# Patient Record
Sex: MALE | Race: WHITE | NOT HISPANIC OR LATINO | Employment: FULL TIME | URBAN - NONMETROPOLITAN AREA
[De-identification: names, ages, dates, MRNs, and addresses within clinical notes are randomized per-mention and may not be internally consistent; named-entity substitution may affect disease eponyms.]

---

## 2017-01-05 RX ORDER — DICLOFENAC SODIUM 75 MG/1
TABLET, DELAYED RELEASE ORAL
Qty: 60 TABLET | Refills: 2 | Status: SHIPPED | OUTPATIENT
Start: 2017-01-05 | End: 2017-07-10 | Stop reason: DRUGHIGH

## 2017-02-21 ENCOUNTER — OFFICE VISIT (OUTPATIENT)
Dept: PAIN MEDICINE | Facility: CLINIC | Age: 47
End: 2017-02-21

## 2017-02-21 ENCOUNTER — APPOINTMENT (OUTPATIENT)
Dept: LAB | Facility: HOSPITAL | Age: 47
End: 2017-02-21

## 2017-02-21 VITALS
WEIGHT: 310.6 LBS | SYSTOLIC BLOOD PRESSURE: 130 MMHG | BODY MASS INDEX: 42.07 KG/M2 | HEIGHT: 72 IN | DIASTOLIC BLOOD PRESSURE: 80 MMHG

## 2017-02-21 DIAGNOSIS — Z79.899 HIGH RISK MEDICATIONS (NOT ANTICOAGULANTS) LONG-TERM USE: ICD-10-CM

## 2017-02-21 DIAGNOSIS — G57.12 MERALGIA PARESTHETICA, LEFT: ICD-10-CM

## 2017-02-21 DIAGNOSIS — G89.29 CHRONIC RIGHT SHOULDER PAIN: Primary | ICD-10-CM

## 2017-02-21 DIAGNOSIS — M25.511 CHRONIC RIGHT SHOULDER PAIN: Primary | ICD-10-CM

## 2017-02-21 PROCEDURE — 80307 DRUG TEST PRSMV CHEM ANLYZR: CPT | Performed by: PAIN MEDICINE

## 2017-02-21 PROCEDURE — G0481 DRUG TEST DEF 8-14 CLASSES: HCPCS | Performed by: PAIN MEDICINE

## 2017-02-21 PROCEDURE — 99214 OFFICE O/P EST MOD 30 MIN: CPT | Performed by: PAIN MEDICINE

## 2017-02-21 RX ORDER — OXYCODONE AND ACETAMINOPHEN 10; 325 MG/1; MG/1
TABLET ORAL
Refills: 0 | COMMUNITY
Start: 2017-01-26 | End: 2023-02-23 | Stop reason: ALTCHOICE

## 2017-02-21 RX ORDER — PNEUMOCOCCAL VACCINE POLYVALENT 25; 25; 25; 25; 25; 25; 25; 25; 25; 25; 25; 25; 25; 25; 25; 25; 25; 25; 25; 25; 25; 25; 25 UG/.5ML; UG/.5ML; UG/.5ML; UG/.5ML; UG/.5ML; UG/.5ML; UG/.5ML; UG/.5ML; UG/.5ML; UG/.5ML; UG/.5ML; UG/.5ML; UG/.5ML; UG/.5ML; UG/.5ML; UG/.5ML; UG/.5ML; UG/.5ML; UG/.5ML; UG/.5ML; UG/.5ML; UG/.5ML; UG/.5ML
INJECTION, SOLUTION INTRAMUSCULAR; SUBCUTANEOUS
Refills: 0 | COMMUNITY
Start: 2017-01-26 | End: 2023-02-23 | Stop reason: ALTCHOICE

## 2017-02-21 RX ORDER — OXYCODONE AND ACETAMINOPHEN 10; 325 MG/1; MG/1
1 TABLET ORAL
Qty: 150 TABLET | Refills: 0 | Status: SHIPPED | OUTPATIENT
Start: 2017-02-21 | End: 2017-03-23

## 2017-02-21 RX ORDER — LIDOCAINE 50 MG/G
OINTMENT TOPICAL
Qty: 1 TUBE | Refills: 11 | Status: SHIPPED | OUTPATIENT
Start: 2017-02-21 | End: 2023-02-23 | Stop reason: ALTCHOICE

## 2017-02-21 RX ORDER — GLIPIZIDE 10 MG/1
TABLET ORAL
Refills: 2 | COMMUNITY
Start: 2017-02-04 | End: 2017-07-10 | Stop reason: DRUGHIGH

## 2017-02-21 RX ORDER — DICLOFENAC SODIUM 75 MG/1
75 TABLET, DELAYED RELEASE ORAL 2 TIMES DAILY
Qty: 60 TABLET | Refills: 2 | Status: SHIPPED | OUTPATIENT
Start: 2017-02-21 | End: 2017-07-10 | Stop reason: SDUPTHER

## 2017-02-21 RX ORDER — DICLOFENAC SODIUM 75 MG/1
TABLET, DELAYED RELEASE ORAL
COMMUNITY
Start: 2012-05-15 | End: 2017-02-21 | Stop reason: SDUPTHER

## 2017-02-21 RX ORDER — LINAGLIPTIN 5 MG/1
TABLET, FILM COATED ORAL
Refills: 2 | COMMUNITY
Start: 2017-02-04

## 2017-02-21 NOTE — PROGRESS NOTES
Ghulam Wright Jr. is a 46 y.o. male.   1970    HPI:   Location: right shoulder  Quality: aching and dull  Severity: 7/10  Timing: constant  Alleviating: pain medication  Aggravating: increased activity     Patient reports that the opioid medication still provides him good relief and allows increased activity than he would have without the opioid medication. He denies side effects.  Has had some left leg pain laterally.  Described as burning.  Denies back pain.  Has had this before.  Shoulder is stable and well controlled.      The following portions of the patient's history were reviewed by me and updated as appropriate: allergies, current medications, past family history, past medical history, past social history, past surgical history and problem list.    Past Medical History   Diagnosis Date   • Drug therapy      Long-term   • Shoulder pain, right        Social History     Social History   • Marital status: Single     Spouse name: N/A   • Number of children: N/A   • Years of education: N/A     Occupational History   • Not on file.     Social History Main Topics   • Smoking status: Current Every Day Smoker     Types: Cigarettes   • Smokeless tobacco: Not on file   • Alcohol use No   • Drug use: No   • Sexual activity: Defer     Other Topics Concern   • Not on file     Social History Narrative       Family History   Problem Relation Age of Onset   • Diabetes Father    • Alcohol abuse Other    • Asthma Other    • ADD / ADHD Other    • Bipolar disorder Other    • Cancer Other      other   • Depression Other      Depressive Disorder   • Hypertension Other    • Lung cancer Other    • Mental illness Other      Mental Disorder   • Osteoarthritis Other    • Other Other      Respiratory Disorder   • Other Other      Smoking tobacco         Current Outpatient Prescriptions:   •  atorvastatin (LIPITOR) 40 MG tablet, Take 40 mg by mouth daily., Disp: , Rfl:   •  diclofenac (VOLTAREN) 75 MG EC tablet, TAKE 1 TABLET BY  MOUTH TWICE DAILY, Disp: 60 tablet, Rfl: 2  •  glipiZIDE (GLUCOTROL) 5 MG tablet, Take 10 mg by mouth 2 (two) times a day before meals., Disp: , Rfl:   •  lansoprazole (PREVACID) 30 MG capsule, Take 30 mg by mouth daily., Disp: , Rfl:   •  lisinopril (PRINIVIL,ZESTRIL) 20 MG tablet, Take 20 mg by mouth daily., Disp: , Rfl:   •  metFORMIN (GLUCOPHAGE) 1000 MG tablet, Take 1,000 mg by mouth 2 (two) times a day with meals., Disp: , Rfl:   •  oxyCODONE-acetaminophen (PERCOCET)  MG per tablet, TK 1 T PO FIVE TIMES DAILY, Disp: , Rfl: 0  •  ranitidine (ZANTAC) 300 MG tablet, Take 300 mg by mouth., Disp: , Rfl:   •  diclofenac (VOLTAREN) 75 MG EC tablet, Take 1 tablet by mouth 2 (Two) Times a Day., Disp: 60 tablet, Rfl: 2  •  FLUVIRIN suspension injection, ADM 0.5ML IM UTD, Disp: , Rfl: 0  •  glipiZIDE (GLUCOTROL) 10 MG tablet, TK 1 T PO BID, Disp: , Rfl: 2  •  lidocaine (PREMIUM LIDOCAINE) 5 % ointment, Apply  topically Every 2 (Two) Hours As Needed for mild pain (1-3)., Disp: 1 tube, Rfl: 11  •  metFORMIN (GLUCOPHAGE) 500 MG tablet, TK 2 TS PO BID FOR DIABETES, Disp: , Rfl: 2  •  NICOTINE STEP 1 21 MG/24HR patch, MINNIE 1 PATCH TRANSDERMALLY ONCE D, Disp: , Rfl: 0  •  oxyCODONE-acetaminophen (PERCOCET)  MG per tablet, Take 1 tablet by mouth 5 (Five) Times a Day for 30 days., Disp: 150 tablet, Rfl: 0  •  oxyCODONE-acetaminophen (PERCOCET)  MG per tablet, Take 1 tablet by mouth 5 (Five) Times a Day for 30 days., Disp: 150 tablet, Rfl: 0  •  oxyCODONE-acetaminophen (PERCOCET) 7.5-325 MG per tablet, Take 1 tablet by mouth 5 times daily as needed., Disp: , Rfl:   •  PNEUMOVAX 23 25 MCG/0.5ML vaccine, ADM 0.5ML IM UTD, Disp: , Rfl: 0  •  TRADJENTA 5 MG tablet tablet, TK 1 T PO QD FOR DIABETES, Disp: , Rfl: 2    No Known Allergies      Review of Systems   Musculoskeletal:        R.shoulder       10 system review of systems was reviewed and negative except for above.    Physical Exam   Constitutional: He appears  well-developed and well-nourished. No distress.   Musculoskeletal:        Right shoulder: He exhibits decreased range of motion (decr rom right shoulder with pain) and tenderness.   Neurological: He is alert. He has normal strength.   decr lt sensation left lat thigh.   Psychiatric: He has a normal mood and affect. His behavior is normal. Judgment normal.       Ghulam was seen today for shoulder pain.    Diagnoses and all orders for this visit:    Chronic right shoulder pain  -     ToxASSURE Select 13 (MW)    High risk medications (not anticoagulants) long-term use  -     ToxASSURE Select 13 (MW)    Meralgia paresthetica, left    Other orders  -     diclofenac (VOLTAREN) 75 MG EC tablet; Take 1 tablet by mouth 2 (Two) Times a Day.  -     oxyCODONE-acetaminophen (PERCOCET)  MG per tablet; Take 1 tablet by mouth 5 (Five) Times a Day for 30 days.  -     oxyCODONE-acetaminophen (PERCOCET)  MG per tablet; Take 1 tablet by mouth 5 (Five) Times a Day for 30 days.  -     lidocaine (PREMIUM LIDOCAINE) 5 % ointment; Apply  topically Every 2 (Two) Hours As Needed for mild pain (1-3).        Medication: Patient reports no negative side effects, Patient reports appropriate usage and storage habits, Patient's opioid provides enough reflief to be more active and perform activities of daily living with less discomfort. and Refill opioid medication as above.  Diclofenac refill.  Lidocaine ointment to lateral left leg.      Interventional: none at this time    Rehab: none at this time.  Suggested wearing loose fitting jeans and belts.  Suggested wt loss.  May consider lfc nerve block.    Behavioral: No aberrant behavior noted. [facility] Report #[id number]  was reviewed and is consistent with stated history    Urine drug screen Ordered today to test for drugs of abuse and prescribed medications.  States he is considering starting diet pills.          This document has been electronically signed by Jere Rasmussen MD on  February 21, 2017 12:20 PM

## 2017-02-25 LAB
CONV REPORT SUMMARY: NORMAL
Lab: NORMAL

## 2017-04-19 ENCOUNTER — APPOINTMENT (OUTPATIENT)
Dept: LAB | Facility: HOSPITAL | Age: 47
End: 2017-04-19

## 2017-04-19 ENCOUNTER — OFFICE VISIT (OUTPATIENT)
Dept: PAIN MEDICINE | Facility: CLINIC | Age: 47
End: 2017-04-19

## 2017-04-19 VITALS
SYSTOLIC BLOOD PRESSURE: 122 MMHG | WEIGHT: 312.9 LBS | DIASTOLIC BLOOD PRESSURE: 72 MMHG | HEIGHT: 72 IN | BODY MASS INDEX: 42.38 KG/M2

## 2017-04-19 DIAGNOSIS — G57.12 MERALGIA PARESTHETICA, LEFT: ICD-10-CM

## 2017-04-19 DIAGNOSIS — G89.29 CHRONIC RIGHT SHOULDER PAIN: Primary | ICD-10-CM

## 2017-04-19 DIAGNOSIS — G57.12 MERALGIA PARESTHETICA, LEFT: Primary | ICD-10-CM

## 2017-04-19 DIAGNOSIS — Z79.899 HIGH RISK MEDICATIONS (NOT ANTICOAGULANTS) LONG-TERM USE: ICD-10-CM

## 2017-04-19 DIAGNOSIS — M25.511 CHRONIC RIGHT SHOULDER PAIN: Primary | ICD-10-CM

## 2017-04-19 PROBLEM — G57.10 MERALGIA PARESTHETICA: Status: ACTIVE | Noted: 2017-04-19

## 2017-04-19 PROCEDURE — G0481 DRUG TEST DEF 8-14 CLASSES: HCPCS | Performed by: NURSE PRACTITIONER

## 2017-04-19 PROCEDURE — 80307 DRUG TEST PRSMV CHEM ANLYZR: CPT | Performed by: NURSE PRACTITIONER

## 2017-04-19 PROCEDURE — 99214 OFFICE O/P EST MOD 30 MIN: CPT | Performed by: NURSE PRACTITIONER

## 2017-04-19 NOTE — PROGRESS NOTES
Ghulam Wright Jr. is a 46 y.o. male.   1970    HPI:   Location: right shoulder  Quality: aching and dull  Severity: 7/10  Timing: constant  Alleviating: pain medication  Aggravating: increased activity      Patient inquires about the lateral femoral cutaneous nerve left injection.  Opiate medications provides enough relief for daily activity and ambulation. NO SE. Pt happy with pain management visit and regimen.         The following portions of the patient's history were reviewed by me and updated as appropriate: allergies, current medications, past family history, past medical history, past social history, past surgical history and problem list.    Past Medical History:   Diagnosis Date   • Drug therapy     Long-term   • Shoulder pain, right        Social History     Social History   • Marital status: Single     Spouse name: N/A   • Number of children: N/A   • Years of education: N/A     Occupational History   • Not on file.     Social History Main Topics   • Smoking status: Current Every Day Smoker     Types: Cigarettes   • Smokeless tobacco: Current User     Types: Chew   • Alcohol use No   • Drug use: No   • Sexual activity: Defer     Other Topics Concern   • Not on file     Social History Narrative       Family History   Problem Relation Age of Onset   • Diabetes Father    • Alcohol abuse Other    • Asthma Other    • ADD / ADHD Other    • Bipolar disorder Other    • Cancer Other      other   • Depression Other      Depressive Disorder   • Hypertension Other    • Lung cancer Other    • Mental illness Other      Mental Disorder   • Osteoarthritis Other    • Other Other      Respiratory Disorder   • Other Other      Smoking tobacco         Current Outpatient Prescriptions:   •  atorvastatin (LIPITOR) 40 MG tablet, Take 40 mg by mouth daily., Disp: , Rfl:   •  diclofenac (VOLTAREN) 75 MG EC tablet, TAKE 1 TABLET BY MOUTH TWICE DAILY, Disp: 60 tablet, Rfl: 2  •  diclofenac (VOLTAREN) 75 MG EC tablet, Take 1  tablet by mouth 2 (Two) Times a Day., Disp: 60 tablet, Rfl: 2  •  FLUVIRIN suspension injection, ADM 0.5ML IM UTD, Disp: , Rfl: 0  •  glipiZIDE (GLUCOTROL) 10 MG tablet, TK 1 T PO BID, Disp: , Rfl: 2  •  glipiZIDE (GLUCOTROL) 5 MG tablet, Take 10 mg by mouth 2 (two) times a day before meals., Disp: , Rfl:   •  lansoprazole (PREVACID) 30 MG capsule, Take 30 mg by mouth daily., Disp: , Rfl:   •  lidocaine (PREMIUM LIDOCAINE) 5 % ointment, Apply  topically Every 2 (Two) Hours As Needed for mild pain (1-3)., Disp: 1 tube, Rfl: 11  •  lisinopril (PRINIVIL,ZESTRIL) 20 MG tablet, Take 20 mg by mouth daily., Disp: , Rfl:   •  metFORMIN (GLUCOPHAGE) 1000 MG tablet, Take 1,000 mg by mouth 2 (two) times a day with meals., Disp: , Rfl:   •  metFORMIN (GLUCOPHAGE) 500 MG tablet, TK 2 TS PO BID FOR DIABETES, Disp: , Rfl: 2  •  NICOTINE STEP 1 21 MG/24HR patch, MINNIE 1 PATCH TRANSDERMALLY ONCE D, Disp: , Rfl: 0  •  oxyCODONE-acetaminophen (PERCOCET)  MG per tablet, TK 1 T PO FIVE TIMES DAILY, Disp: , Rfl: 0  •  oxyCODONE-acetaminophen (PERCOCET) 7.5-325 MG per tablet, Take 1 tablet by mouth 5 times daily as needed., Disp: , Rfl:   •  PNEUMOVAX 23 25 MCG/0.5ML vaccine, ADM 0.5ML IM UTD, Disp: , Rfl: 0  •  ranitidine (ZANTAC) 300 MG tablet, Take 300 mg by mouth., Disp: , Rfl:   •  TRADJENTA 5 MG tablet tablet, TK 1 T PO QD FOR DIABETES, Disp: , Rfl: 2    No Known Allergies      Review of Systems   Musculoskeletal:        Right shoulder pain     10 system review of systems was reviewed and negative except for above.    Physical Exam   Constitutional: He is oriented to person, place, and time. He appears well-developed and well-nourished. No distress.   Musculoskeletal:        Right shoulder: He exhibits decreased range of motion ( 120 deg abd) and tenderness ( ).   Right shoulder painful AROM   Neurological: He is alert and oriented to person, place, and time. He has normal strength.   Decrease in sensation left lateral thigh.    Skin: Skin is warm.   Psychiatric: He has a normal mood and affect. His behavior is normal. Judgment normal. He expresses no homicidal and no suicidal ideation. He expresses no suicidal plans and no homicidal plans.   Nursing note and vitals reviewed.      Ghulam was seen today for shoulder pain.    Diagnoses and all orders for this visit:    Chronic right shoulder pain    Meralgia paresthetica, left    High risk medications (not anticoagulants) long-term use  -     ToxASSURE Select 13 (MW)      Medication: Patient reports no negative side effects, Patient reports appropriate usage and storage habits and Patient's opioid provides enough reflief to be more active and perform activities of daily living with less discomfort. Percocet 10mg 5 x day. Discussed case with Juan Rasmussen, opiate medication refilled ×2 hand written scripts.      Interventional: Pt continues wearing loose closing and belts. Pt considering lfc nerve block and ordered today. Risk and Benefit of procedure Left lateral femoral cutaneous nerve block. This is ordered for Future visits.    Rehab: Pt does stretches and exercises.     Behavioral: No aberrant behavior noted. [facility] Report # [id number]  was reviewed and is consistent with stated history    Urine drug screen Ordered today to test for drugs of abuse and prescribed medications          This document has been electronically signed by GAGANDEEP Gagnon on April 19, 2017 2:24 PM          This document has been electronically signed by GAGANDEEP Gagnon on April 19, 2017 2:24 PM

## 2017-04-27 LAB
CONV REPORT SUMMARY: NORMAL
Lab: NORMAL

## 2017-07-10 ENCOUNTER — OFFICE VISIT (OUTPATIENT)
Dept: PAIN MEDICINE | Facility: CLINIC | Age: 47
End: 2017-07-10

## 2017-07-10 VITALS
SYSTOLIC BLOOD PRESSURE: 130 MMHG | WEIGHT: 304.7 LBS | BODY MASS INDEX: 41.27 KG/M2 | DIASTOLIC BLOOD PRESSURE: 90 MMHG | HEIGHT: 72 IN

## 2017-07-10 DIAGNOSIS — Z79.899 HIGH RISK MEDICATIONS (NOT ANTICOAGULANTS) LONG-TERM USE: ICD-10-CM

## 2017-07-10 DIAGNOSIS — M25.511 CHRONIC RIGHT SHOULDER PAIN: Primary | ICD-10-CM

## 2017-07-10 DIAGNOSIS — G57.12 MERALGIA PARESTHETICA, LEFT: ICD-10-CM

## 2017-07-10 DIAGNOSIS — G89.29 CHRONIC RIGHT SHOULDER PAIN: Primary | ICD-10-CM

## 2017-07-10 PROCEDURE — 99214 OFFICE O/P EST MOD 30 MIN: CPT | Performed by: PAIN MEDICINE

## 2017-07-10 RX ORDER — OXYCODONE AND ACETAMINOPHEN 10; 325 MG/1; MG/1
1 TABLET ORAL
Qty: 150 TABLET | Refills: 0 | Status: SHIPPED | OUTPATIENT
Start: 2017-07-10 | End: 2017-08-09

## 2017-07-10 RX ORDER — DICLOFENAC SODIUM 75 MG/1
75 TABLET, DELAYED RELEASE ORAL 2 TIMES DAILY
Qty: 60 TABLET | Refills: 2 | Status: SHIPPED | OUTPATIENT
Start: 2017-07-10

## 2017-07-10 NOTE — PROGRESS NOTES
Ghulam Wright Jr. is a 46 y.o. male.   1970    HPI:   Location: right shoulder and left thigh  Quality: aching and dull  Severity: 8/10  Timing: constant  Alleviating: pain medication  Aggravating: increased activity     Recently had teeth pulled.  Had recently rescheduled an appt and is a bit late.  Has stretched it out to now.  Patient reports that the opioid medication still provides him good relief and allows increased activity than he would have without the opioid medication. He denies side effects.        The following portions of the patient's history were reviewed by me and updated as appropriate: allergies, current medications, past family history, past medical history, past social history, past surgical history and problem list.    Past Medical History:   Diagnosis Date   • Drug therapy     Long-term   • Shoulder pain, right        Social History     Social History   • Marital status: Single     Spouse name: N/A   • Number of children: N/A   • Years of education: N/A     Occupational History   • Not on file.     Social History Main Topics   • Smoking status: Current Every Day Smoker     Types: Cigarettes   • Smokeless tobacco: Current User     Types: Chew   • Alcohol use No   • Drug use: No   • Sexual activity: Defer     Other Topics Concern   • Not on file     Social History Narrative       Family History   Problem Relation Age of Onset   • Diabetes Father    • Alcohol abuse Other    • Asthma Other    • ADD / ADHD Other    • Bipolar disorder Other    • Cancer Other      other   • Depression Other      Depressive Disorder   • Hypertension Other    • Lung cancer Other    • Mental illness Other      Mental Disorder   • Osteoarthritis Other    • Other Other      Respiratory Disorder   • Other Other      Smoking tobacco         Current Outpatient Prescriptions:   •  atorvastatin (LIPITOR) 40 MG tablet, Take 40 mg by mouth daily., Disp: , Rfl:   •  diclofenac (VOLTAREN) 75 MG EC tablet, Take 1 tablet by  mouth 2 (Two) Times a Day., Disp: 60 tablet, Rfl: 2  •  FLUVIRIN suspension injection, ADM 0.5ML IM UTD, Disp: , Rfl: 0  •  glipiZIDE (GLUCOTROL) 5 MG tablet, Take 10 mg by mouth 2 (two) times a day before meals., Disp: , Rfl:   •  lansoprazole (PREVACID) 30 MG capsule, Take 30 mg by mouth daily., Disp: , Rfl:   •  lidocaine (PREMIUM LIDOCAINE) 5 % ointment, Apply  topically Every 2 (Two) Hours As Needed for mild pain (1-3)., Disp: 1 tube, Rfl: 11  •  lisinopril (PRINIVIL,ZESTRIL) 20 MG tablet, Take 20 mg by mouth daily., Disp: , Rfl:   •  metFORMIN (GLUCOPHAGE) 1000 MG tablet, Take 1,000 mg by mouth 2 (two) times a day with meals., Disp: , Rfl:   •  NICOTINE STEP 1 21 MG/24HR patch, MINNIE 1 PATCH TRANSDERMALLY ONCE D, Disp: , Rfl: 0  •  oxyCODONE-acetaminophen (PERCOCET)  MG per tablet, TK 1 T PO FIVE TIMES DAILY, Disp: , Rfl: 0  •  PNEUMOVAX 23 25 MCG/0.5ML vaccine, ADM 0.5ML IM UTD, Disp: , Rfl: 0  •  ranitidine (ZANTAC) 300 MG tablet, Take 300 mg by mouth., Disp: , Rfl:   •  TRADJENTA 5 MG tablet tablet, TK 1 T PO QD FOR DIABETES, Disp: , Rfl: 2  •  oxyCODONE-acetaminophen (PERCOCET)  MG per tablet, Take 1 tablet by mouth 5 (Five) Times a Day for 30 days., Disp: 150 tablet, Rfl: 0  •  oxyCODONE-acetaminophen (PERCOCET)  MG per tablet, Take 1 tablet by mouth 5 (Five) Times a Day for 30 days., Disp: 150 tablet, Rfl: 0    No Known Allergies    Review of Systems   Musculoskeletal:        Right shoulder pain  Left thigh pain   All other systems reviewed and are negative.    All systems reviewed and negative except for above.    Physical Exam   Constitutional: He appears well-developed and well-nourished. No distress.   Musculoskeletal:        Right shoulder: He exhibits decreased range of motion (decr rom right shoulder with pain) and tenderness.   Neurological: He is alert. He has normal strength.   decr lt sensation left lat thigh.   Psychiatric: He has a normal mood and affect. His behavior is  normal. Judgment normal.       Ghulam was seen today for shoulder pain and pain.    Diagnoses and all orders for this visit:    Chronic right shoulder pain    Meralgia paresthetica, left  -     Other Peripheral Nerve or Branch Block; Future    High risk medications (not anticoagulants) long-term use    Other orders  -     oxyCODONE-acetaminophen (PERCOCET)  MG per tablet; Take 1 tablet by mouth 5 (Five) Times a Day for 30 days.  -     oxyCODONE-acetaminophen (PERCOCET)  MG per tablet; Take 1 tablet by mouth 5 (Five) Times a Day for 30 days.  -     diclofenac (VOLTAREN) 75 MG EC tablet; Take 1 tablet by mouth 2 (Two) Times a Day.        Medication: Patient reports no negative side effects, Patient reports appropriate usage and storage habits, Patient's opioid provides enough reflief to be more active and perform activities of daily living with less discomfort. and Refill opioid medication as above.    Interventional:  I have discussed the risks and benefits of the procedure with the patient.   Reschedule left lfc nerve block.  Pt had to cancel last scheduled.     Rehab: none at this time    Behavioral: No aberrant behavior noted. [facility] Report #[id number]  was reviewed and is consistent with stated history    Urine drug screen Reviewed from last visit and is appropriate          This document has been electronically signed by Jere Rasmussen MD on July 10, 2017 10:04 AM

## 2017-09-07 ENCOUNTER — APPOINTMENT (OUTPATIENT)
Dept: LAB | Facility: HOSPITAL | Age: 47
End: 2017-09-07

## 2017-09-07 ENCOUNTER — OFFICE VISIT (OUTPATIENT)
Dept: PAIN MEDICINE | Facility: CLINIC | Age: 47
End: 2017-09-07

## 2017-09-07 VITALS
BODY MASS INDEX: 41.54 KG/M2 | DIASTOLIC BLOOD PRESSURE: 82 MMHG | HEIGHT: 72 IN | WEIGHT: 306.7 LBS | SYSTOLIC BLOOD PRESSURE: 130 MMHG

## 2017-09-07 DIAGNOSIS — Z79.899 HIGH RISK MEDICATIONS (NOT ANTICOAGULANTS) LONG-TERM USE: ICD-10-CM

## 2017-09-07 DIAGNOSIS — M25.511 CHRONIC RIGHT SHOULDER PAIN: ICD-10-CM

## 2017-09-07 DIAGNOSIS — G89.29 CHRONIC RIGHT SHOULDER PAIN: ICD-10-CM

## 2017-09-07 DIAGNOSIS — G57.12 MERALGIA PARESTHETICA, LEFT: Primary | ICD-10-CM

## 2017-09-07 PROCEDURE — 99214 OFFICE O/P EST MOD 30 MIN: CPT | Performed by: PAIN MEDICINE

## 2017-09-07 PROCEDURE — G0481 DRUG TEST DEF 8-14 CLASSES: HCPCS | Performed by: NURSE PRACTITIONER

## 2017-09-07 PROCEDURE — 80307 DRUG TEST PRSMV CHEM ANLYZR: CPT | Performed by: NURSE PRACTITIONER

## 2017-09-07 NOTE — PROGRESS NOTES
Ghulam Wright Jr. is a 47 y.o. male.   1970    HPI:   Location: right shoulder  Left thigh  Quality: aching and dull  Severity: 8/10  Timing: constant  Alleviating: pain medication  Aggravating: increased activity    Pt with upcoming Peripheral nerve block Left thigh. Opiates remains effective. Opiate medications provides enough relief for daily activity and ambulation. NO SE. Pt happy with pain management visit and regimen.       The following portions of the patient's history were reviewed by me and updated as appropriate: allergies, current medications, past family history, past medical history, past social history, past surgical history and problem list.    Past Medical History:   Diagnosis Date   • Drug therapy     Long-term   • Shoulder pain, right        Social History     Social History   • Marital status: Single     Spouse name: N/A   • Number of children: N/A   • Years of education: N/A     Occupational History   • Not on file.     Social History Main Topics   • Smoking status: Current Every Day Smoker     Types: Cigarettes   • Smokeless tobacco: Current User     Types: Chew   • Alcohol use No   • Drug use: No   • Sexual activity: Defer     Other Topics Concern   • Not on file     Social History Narrative       Family History   Problem Relation Age of Onset   • Diabetes Father    • Alcohol abuse Other    • Asthma Other    • ADD / ADHD Other    • Bipolar disorder Other    • Cancer Other      other   • Depression Other      Depressive Disorder   • Hypertension Other    • Lung cancer Other    • Mental illness Other      Mental Disorder   • Osteoarthritis Other    • Other Other      Respiratory Disorder   • Other Other      Smoking tobacco         Current Outpatient Prescriptions:   •  atorvastatin (LIPITOR) 40 MG tablet, Take 40 mg by mouth daily., Disp: , Rfl:   •  diclofenac (VOLTAREN) 75 MG EC tablet, Take 1 tablet by mouth 2 (Two) Times a Day., Disp: 60 tablet, Rfl: 2  •  FLUVIRIN suspension  injection, ADM 0.5ML IM UTD, Disp: , Rfl: 0  •  glipiZIDE (GLUCOTROL) 5 MG tablet, Take 10 mg by mouth 2 (two) times a day before meals., Disp: , Rfl:   •  lansoprazole (PREVACID) 30 MG capsule, Take 30 mg by mouth daily., Disp: , Rfl:   •  lidocaine (PREMIUM LIDOCAINE) 5 % ointment, Apply  topically Every 2 (Two) Hours As Needed for mild pain (1-3)., Disp: 1 tube, Rfl: 11  •  lisinopril (PRINIVIL,ZESTRIL) 20 MG tablet, Take 20 mg by mouth daily., Disp: , Rfl:   •  metFORMIN (GLUCOPHAGE) 1000 MG tablet, Take 1,000 mg by mouth 2 (two) times a day with meals., Disp: , Rfl:   •  NICOTINE STEP 1 21 MG/24HR patch, MINNIE 1 PATCH TRANSDERMALLY ONCE D, Disp: , Rfl: 0  •  oxyCODONE-acetaminophen (PERCOCET)  MG per tablet, TK 1 T PO FIVE TIMES DAILY, Disp: , Rfl: 0  •  PNEUMOVAX 23 25 MCG/0.5ML vaccine, ADM 0.5ML IM UTD, Disp: , Rfl: 0  •  ranitidine (ZANTAC) 300 MG tablet, Take 300 mg by mouth., Disp: , Rfl:   •  TRADJENTA 5 MG tablet tablet, TK 1 T PO QD FOR DIABETES, Disp: , Rfl: 2    No Known Allergies    Review of Systems   Musculoskeletal:        Right shoulder pain left thigh pain   All other systems reviewed and are negative.    All systems reviewed and negative except for above.    Physical Exam   Constitutional: He is oriented to person, place, and time. He appears well-developed and well-nourished. No distress.   Musculoskeletal:        Right shoulder: He exhibits decreased range of motion (Abd limited less than 120 deg) and tenderness (  joint).   Right shoulder painful AROM   Neurological: He is alert and oriented to person, place, and time. He has normal strength. He displays no tremor. He displays no seizure activity. Coordination normal.   Decrease in sensation left lateral thigh.   Skin: Skin is warm and dry. No rash noted. No pallor.   Psychiatric: He has a normal mood and affect. His behavior is normal. Judgment normal. He expresses no homicidal and no suicidal ideation. He expresses no suicidal plans  and no homicidal plans.   Nursing note and vitals reviewed.      Ghulam was seen today for shoulder pain and lower extremity issue.    Diagnoses and all orders for this visit:    Meralgia paresthetica, left    Chronic right shoulder pain    High risk medications (not anticoagulants) long-term use  -     ToxASSURE Select 13 (MW)        Medication: Patient reports no negative side effects, Patient reports appropriate usage and storage habits and Patient's opioid provides enough reflief to be more active and perform activities of daily living with less discomfort. Percocet 10mg 5 x day. Discussed case with Juan Rasmussen, opiate medication refilled ×2 hand written scripts.      Interventional: Pt with upcoming Peripheral nerve block Left thigh. Opiates remains effective. CJ and any neurological impairments discussed with patient that may need of emergency evaluation.      Rehab: work 5 days a week outside.     Behavioral: No aberrant behavior noted. [facility] Report # [id number]  was reviewed and is consistent with stated history    Urine drug screen Ordered today to test for drugs of abuse and prescribed medications          This document has been electronically signed by GAGANDEEP Gagnon on September 7, 2017 12:11 PM          This document has been electronically signed by GAGANDEEP Gagnon on September 7, 2017 12:11 PM

## 2017-09-08 ENCOUNTER — PROCEDURE VISIT (OUTPATIENT)
Dept: PAIN MEDICINE | Facility: CLINIC | Age: 47
End: 2017-09-08

## 2017-09-08 VITALS
SYSTOLIC BLOOD PRESSURE: 128 MMHG | WEIGHT: 305 LBS | HEIGHT: 72 IN | DIASTOLIC BLOOD PRESSURE: 80 MMHG | BODY MASS INDEX: 41.31 KG/M2

## 2017-09-08 DIAGNOSIS — G57.12 MERALGIA PARESTHETICA OF LEFT SIDE: Primary | ICD-10-CM

## 2017-09-08 PROCEDURE — 64450 NJX AA&/STRD OTHER PN/BRANCH: CPT | Performed by: PAIN MEDICINE

## 2017-09-08 NOTE — PROGRESS NOTES
Left lateral femoral cutaneous nerve block    Pre-operative diagnosis: Left meralgia paresthetica.    Post-operative diagnosis: Left meralgia paresthetica    After risks and benefits were explained including bleeding, infection, worsening of the pain, damage to the area being injected, weakness, allergic reaction to medications, vascular injection, and nerve damage, signed consent was obtained.  All questions were answered.      The area of the injecion was identified and the skin prepped chloroprep and allowed to dry.  Next, a 22 gauge 1.5 inch needle was placed into a point medial to asis on left.  This was injected with 5ml of lidocaine and 40mg of methylprednisolone.    The patient tolerated the procedure well and there were no complications.      ndc#[id number]

## 2017-09-14 LAB — CONV REPORT SUMMARY: NORMAL

## 2017-11-02 ENCOUNTER — OFFICE VISIT (OUTPATIENT)
Dept: PAIN MEDICINE | Facility: CLINIC | Age: 47
End: 2017-11-02

## 2017-11-02 VITALS
BODY MASS INDEX: 41.16 KG/M2 | SYSTOLIC BLOOD PRESSURE: 142 MMHG | DIASTOLIC BLOOD PRESSURE: 84 MMHG | WEIGHT: 303.9 LBS | HEIGHT: 72 IN

## 2017-11-02 DIAGNOSIS — G57.12 MERALGIA PARESTHETICA OF LEFT SIDE: ICD-10-CM

## 2017-11-02 DIAGNOSIS — M25.511 CHRONIC RIGHT SHOULDER PAIN: Primary | ICD-10-CM

## 2017-11-02 DIAGNOSIS — G89.29 CHRONIC RIGHT SHOULDER PAIN: Primary | ICD-10-CM

## 2017-11-02 DIAGNOSIS — M79.18 MYOFACIAL MUSCLE PAIN: ICD-10-CM

## 2017-11-02 DIAGNOSIS — Z79.899 HIGH RISK MEDICATIONS (NOT ANTICOAGULANTS) LONG-TERM USE: ICD-10-CM

## 2017-11-02 DIAGNOSIS — E66.3 OVERWEIGHT: ICD-10-CM

## 2017-11-02 PROCEDURE — 99214 OFFICE O/P EST MOD 30 MIN: CPT | Performed by: PAIN MEDICINE

## 2017-11-02 NOTE — PROGRESS NOTES
Ghulam Wright Jr. is a 47 y.o. male.   1970    HPI:   Location: right shoulder and left thigh  Quality: aching and dull  Severity: 8/10  Timing: constant  Alleviating: pain medication  Aggravating: increased activity    Pt with chronic right shoulder and left thigh pain. Pt takes EC voltaren via PCP. Opiate medications provides enough relief for daily activity and ambulation. NO SE. Pt happy with pain management visit and regimen.         The following portions of the patient's history were reviewed by me and updated as appropriate: allergies, current medications, past family history, past medical history, past social history, past surgical history and problem list.    Past Medical History:   Diagnosis Date   • Chronic pain disorder    • Drug therapy     Long-term   • Shoulder pain, right        Social History     Social History   • Marital status: Single     Spouse name: N/A   • Number of children: N/A   • Years of education: N/A     Occupational History   • Not on file.     Social History Main Topics   • Smoking status: Current Every Day Smoker     Types: Cigarettes   • Smokeless tobacco: Current User     Types: Chew   • Alcohol use No   • Drug use: No   • Sexual activity: Defer     Other Topics Concern   • Not on file     Social History Narrative       Family History   Problem Relation Age of Onset   • Diabetes Father    • Alcohol abuse Other    • Asthma Other    • ADD / ADHD Other    • Bipolar disorder Other    • Cancer Other      other   • Depression Other      Depressive Disorder   • Hypertension Other    • Lung cancer Other    • Mental illness Other      Mental Disorder   • Osteoarthritis Other    • Other Other      Respiratory Disorder   • Other Other      Smoking tobacco         Current Outpatient Prescriptions:   •  atorvastatin (LIPITOR) 40 MG tablet, Take 40 mg by mouth daily., Disp: , Rfl:   •  diclofenac (VOLTAREN) 75 MG EC tablet, Take 1 tablet by mouth 2 (Two) Times a Day., Disp: 60 tablet,  "Rfl: 2  •  FLUVIRIN suspension injection, ADM 0.5ML IM UTD, Disp: , Rfl: 0  •  glipiZIDE (GLUCOTROL) 5 MG tablet, Take 10 mg by mouth 2 (two) times a day before meals., Disp: , Rfl:   •  lansoprazole (PREVACID) 30 MG capsule, Take 30 mg by mouth daily., Disp: , Rfl:   •  lidocaine (PREMIUM LIDOCAINE) 5 % ointment, Apply  topically Every 2 (Two) Hours As Needed for mild pain (1-3)., Disp: 1 tube, Rfl: 11  •  lisinopril (PRINIVIL,ZESTRIL) 20 MG tablet, Take 20 mg by mouth daily., Disp: , Rfl:   •  metFORMIN (GLUCOPHAGE) 1000 MG tablet, Take 1,000 mg by mouth 2 (two) times a day with meals., Disp: , Rfl:   •  NICOTINE STEP 1 21 MG/24HR patch, MINNIE 1 PATCH TRANSDERMALLY ONCE D, Disp: , Rfl: 0  •  oxyCODONE-acetaminophen (PERCOCET)  MG per tablet, TK 1 T PO FIVE TIMES DAILY, Disp: , Rfl: 0  •  PNEUMOVAX 23 25 MCG/0.5ML vaccine, ADM 0.5ML IM UTD, Disp: , Rfl: 0  •  ranitidine (ZANTAC) 300 MG tablet, Take 300 mg by mouth., Disp: , Rfl:   •  TRADJENTA 5 MG tablet tablet, TK 1 T PO QD FOR DIABETES, Disp: , Rfl: 2    No Known Allergies    Review of Systems   Musculoskeletal:        R.shoulder,l.thigh   All other systems reviewed and are negative.    All systems reviewed and negative except for above.    Physical Exam   Constitutional: He is oriented to person, place, and time. He appears well-developed and well-nourished. No distress.   Cardiovascular: Normal rate.    Pulmonary/Chest: Effort normal. No respiratory distress.   Musculoskeletal:        Right shoulder: He exhibits decreased range of motion (< 130 abd) and tenderness (anterior ttp).   Right shoulder painful AROM   Neurological: He is alert and oriented to person, place, and time. He has normal strength. He displays no tremor. A sensory deficit ( left lateral thight \"decrease sensation\") is present. He displays no seizure activity. Coordination normal.   Skin: Skin is warm and dry. No rash noted. No pallor.   Psychiatric: He has a normal mood and affect. His " behavior is normal. Judgment normal. He expresses no homicidal and no suicidal ideation. He expresses no suicidal plans and no homicidal plans.   Nursing note and vitals reviewed.      Ghulam was seen today for shoulder pain and pain.    Diagnoses and all orders for this visit:    Chronic right shoulder pain    Meralgia paresthetica of left side    High risk medications (not anticoagulants) long-term use    Myofacial muscle pain    Overweight        Medication: Patient reports no negative side effects, Patient reports appropriate usage and storage habits and Patient's opioid provides enough relief to be more active and perform activities of daily living with less discomfort. Percocet 10mg 5 x days. Discussed case with Juan Rasmussen, opiate medication refilled ×2 hand written scripts.      Interventional: I have discussed weight control and issues with current exam, current weight 303lb. Pt is currently on weight loss diet-meds as well.   Last 2 weights    11/02/17  1222   Weight: (!) 303 lb 14.4 oz (138 kg)           Rehab: Home stretching for right shoulder.     Behavioral: No aberrant behavior noted. PONCHO Report # [id number]  was reviewed and is consistent with stated history    Urine drug screen Reviewed from last visit and is appropriate           This document has been electronically signed by GAGANDEEP Gagnon on November 2, 2017 12:39 PM          This document has been electronically signed by GAGANDEEP Gagnon on November 2, 2017 12:39 PM

## 2022-10-22 ENCOUNTER — APPOINTMENT (OUTPATIENT)
Dept: ULTRASOUND IMAGING | Facility: HOSPITAL | Age: 52
End: 2022-10-22

## 2022-10-22 ENCOUNTER — APPOINTMENT (OUTPATIENT)
Dept: CT IMAGING | Facility: HOSPITAL | Age: 52
End: 2022-10-22

## 2022-10-22 ENCOUNTER — HOSPITAL ENCOUNTER (EMERGENCY)
Facility: HOSPITAL | Age: 52
Discharge: HOME | End: 2022-10-22
Admitting: STUDENT IN AN ORGANIZED HEALTH CARE EDUCATION/TRAINING PROGRAM

## 2022-10-22 VITALS
TEMPERATURE: 98 F | RESPIRATION RATE: 16 BRPM | BODY MASS INDEX: 31.15 KG/M2 | DIASTOLIC BLOOD PRESSURE: 97 MMHG | SYSTOLIC BLOOD PRESSURE: 139 MMHG | HEART RATE: 96 BPM | HEIGHT: 72 IN | WEIGHT: 230 LBS | OXYGEN SATURATION: 100 %

## 2022-10-22 DIAGNOSIS — Z87.19 HISTORY OF GASTROESOPHAGEAL REFLUX (GERD): ICD-10-CM

## 2022-10-22 DIAGNOSIS — M54.2 NECK PAIN: ICD-10-CM

## 2022-10-22 DIAGNOSIS — S13.9XXA NECK SPRAIN, INITIAL ENCOUNTER: Primary | ICD-10-CM

## 2022-10-22 DIAGNOSIS — N50.811 PAIN IN RIGHT TESTICLE: ICD-10-CM

## 2022-10-22 LAB — HOLD SPECIMEN: NORMAL

## 2022-10-22 PROCEDURE — 72125 CT NECK SPINE W/O DYE: CPT

## 2022-10-22 PROCEDURE — 76870 US EXAM SCROTUM: CPT

## 2022-10-22 PROCEDURE — 93975 VASCULAR STUDY: CPT

## 2022-10-22 PROCEDURE — 99284 EMERGENCY DEPT VISIT MOD MDM: CPT

## 2022-10-22 RX ORDER — ONDANSETRON 4 MG/1
4 TABLET, ORALLY DISINTEGRATING ORAL EVERY 6 HOURS PRN
Qty: 56 TABLET | Refills: 0 | Status: SHIPPED | OUTPATIENT
Start: 2022-10-22 | End: 2022-11-05

## 2022-10-22 NOTE — DISCHARGE INSTRUCTIONS
Please return to the ED with any new or worsening symptoms, please follow-up with your PCP in a few days and with neurosurgery soon as possible.  Can take Tylenol/ibuprofen for pain, continue taking omeprazole at home for GERD, I have sent in Zofran at her request for any nausea related to this.

## 2022-10-22 NOTE — ED NOTES
Pt unsure of complete list of meds.  Pt states he knows he takes meds for DM.  Pt pharmacy closed on weekends.
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
## 2023-01-17 ENCOUNTER — TELEPHONE (OUTPATIENT)
Dept: NEUROSURGERY | Facility: CLINIC | Age: 53
End: 2023-01-17
Payer: COMMERCIAL

## 2023-01-17 NOTE — TELEPHONE ENCOUNTER
"I called the patient to let him know we don't \"treat headaches\" and this is a work comp case and he has been approved to be seen by Dr Cruz for his neck pain.  I told him I do not have anything prior to 2/23/23 to get him in and that sometimes a neck injury can cause headaches.  He then became very upset and raised his voice stating he continues to get different answers from everyone in this office and that is why he only wants to see a doctor/MD and not just a PA (his note and his original statement in this phone conversation was that he wanted to see the PA for his headaches).  He said he saw orthopedic for his shoulder and they told him he needed to see Dr Cruz for his headache and that is why he has an appt.  I told him we would be happy to see him on 2/23/23 as scheduled but in order to get him in to see our nurse practitioner we would need a new authorization from his work comp.  He stated he would just keep the appt as scheduled and would talk to Dr Cruz then about treating his headaches.    SANTINO CASH [facility]  PHYSICIAN LEAD  DR NYLA CRUZ  [facility]      "

## 2023-01-17 NOTE — TELEPHONE ENCOUNTER
Caller: Ghulam Wright Jr.    Relationship to patient: Self    Best call back number: [phone]    Chief complaint: CONSTANT HEADACHE EVERY DAY , DOES NOT GO AWAY    Type of visit: NEW PT    Requested date: ASAP    If rescheduling, when is the original appointment: 2-23-23 WITH ANTHONY    Additional notes:PT IS REQUESTING TO KEEP HIS APPT WITH DR. CRUZ BUT SINCE HE IS HAVING HEADACHES EVERYDAY HE WOULD NOW LIKE TO SEE A PA-C FOR A SOONER APPT BUT HE DOES NOT WANT TO CANCEL THE APPT ON 2-23-23 WITH ANTHONY.     PLEASE CALL PT TO ADVISE.     THANK YOU

## 2023-01-19 ENCOUNTER — OFFICE VISIT (OUTPATIENT)
Dept: UROLOGY | Age: 53
End: 2023-01-19

## 2023-01-19 VITALS
HEART RATE: 98 BPM | HEIGHT: 72 IN | RESPIRATION RATE: 18 BRPM | WEIGHT: 246 LBS | SYSTOLIC BLOOD PRESSURE: 149 MMHG | DIASTOLIC BLOOD PRESSURE: 70 MMHG | OXYGEN SATURATION: 99 % | TEMPERATURE: 98.2 F | BODY MASS INDEX: 33.32 KG/M2

## 2023-01-19 DIAGNOSIS — K40.90 RIGHT INGUINAL HERNIA: Primary | ICD-10-CM

## 2023-01-19 DIAGNOSIS — R10.31 RIGHT GROIN PAIN: ICD-10-CM

## 2023-01-19 DIAGNOSIS — N50.811 RIGHT TESTICULAR PAIN: ICD-10-CM

## 2023-01-19 PROBLEM — I10 HYPERTENSIVE DISORDER: Status: ACTIVE | Noted: 2017-04-28

## 2023-01-19 PROBLEM — M25.511 PAIN IN JOINT OF RIGHT SHOULDER: Status: ACTIVE | Noted: 2018-02-04

## 2023-01-19 PROBLEM — E78.2 MIXED HYPERLIPIDEMIA: Status: ACTIVE | Noted: 2017-04-28

## 2023-01-19 PROBLEM — F17.200 NICOTINE DEPENDENCE: Status: ACTIVE | Noted: 2022-12-13

## 2023-01-19 PROBLEM — J01.90 ACUTE SINUSITIS: Status: ACTIVE | Noted: 2018-09-05

## 2023-01-19 PROBLEM — R68.89 ABNORMAL FINDING ON EVALUATION PROCEDURE: Status: ACTIVE | Noted: 2020-05-19

## 2023-01-19 PROBLEM — E11.69 MIXED HYPERLIPIDEMIA DUE TO TYPE 2 DIABETES MELLITUS (HCC): Status: ACTIVE | Noted: 2022-12-13

## 2023-01-19 PROBLEM — D75.1 SECONDARY POLYCYTHEMIA: Status: ACTIVE | Noted: 2017-04-28

## 2023-01-19 PROBLEM — G89.29 CHRONIC RIGHT SHOULDER PAIN: Status: ACTIVE | Noted: 2017-04-19

## 2023-01-19 PROBLEM — M19.90 ARTHRITIS: Status: ACTIVE | Noted: 2022-12-13

## 2023-01-19 PROBLEM — M77.11 LATERAL EPICONDYLITIS OF RIGHT ELBOW: Status: ACTIVE | Noted: 2017-11-24

## 2023-01-19 PROBLEM — F40.298 FEAR OF HYPERTENSION: Status: ACTIVE | Noted: 2017-11-12

## 2023-01-19 PROBLEM — L60.0 INGROWING NAIL: Status: ACTIVE | Noted: 2018-11-26

## 2023-01-19 PROBLEM — B35.1 ONYCHOMYCOSIS: Status: ACTIVE | Noted: 2018-05-07

## 2023-01-19 PROBLEM — E66.01 SEVERE OBESITY (HCC): Status: ACTIVE | Noted: 2018-05-07

## 2023-01-19 PROBLEM — G47.33 OBSTRUCTIVE SLEEP APNEA SYNDROME: Status: ACTIVE | Noted: 2019-05-20

## 2023-01-19 PROBLEM — E66.9 OBESITY WITH BODY MASS INDEX 30 OR GREATER: Status: ACTIVE | Noted: 2020-05-19

## 2023-01-19 PROBLEM — G57.10 MERALGIA PARESTHETICA: Status: ACTIVE | Noted: 2017-04-19

## 2023-01-19 PROBLEM — E11.8 DISORDER DUE TO WELL CONTROLLED TYPE 2 DIABETES MELLITUS (HCC): Status: ACTIVE | Noted: 2018-08-27

## 2023-01-19 PROBLEM — E78.2 MIXED HYPERLIPIDEMIA DUE TO TYPE 2 DIABETES MELLITUS (HCC): Status: ACTIVE | Noted: 2022-12-13

## 2023-01-19 PROBLEM — K52.9 CHRONIC DIARRHEA: Status: ACTIVE | Noted: 2020-02-09

## 2023-01-19 PROBLEM — M25.511 CHRONIC RIGHT SHOULDER PAIN: Status: ACTIVE | Noted: 2017-04-19

## 2023-01-19 PROBLEM — R91.1 LUNG NODULE: Status: ACTIVE | Noted: 2022-12-13

## 2023-01-19 PROBLEM — M25.561 ACUTE PAIN OF RIGHT KNEE: Status: ACTIVE | Noted: 2018-08-27

## 2023-01-19 PROBLEM — M17.10 OSTEOARTHRITIS OF PATELLOFEMORAL JOINT: Status: ACTIVE | Noted: 2018-08-27

## 2023-01-19 PROBLEM — R06.83 SNORING: Status: ACTIVE | Noted: 2018-05-07

## 2023-01-19 RX ORDER — ATORVASTATIN CALCIUM 40 MG/1
40 TABLET, FILM COATED ORAL DAILY
COMMUNITY

## 2023-01-19 RX ORDER — LINAGLIPTIN 5 MG/1
TABLET, FILM COATED ORAL
COMMUNITY
Start: 2023-01-06

## 2023-01-19 RX ORDER — GLIPIZIDE 5 MG/1
10 TABLET ORAL
COMMUNITY

## 2023-01-19 RX ORDER — FAMOTIDINE 40 MG/1
TABLET, FILM COATED ORAL
COMMUNITY
Start: 2023-01-06

## 2023-01-19 RX ORDER — ROSUVASTATIN CALCIUM 20 MG/1
TABLET, COATED ORAL
COMMUNITY
Start: 2023-01-06

## 2023-01-19 RX ORDER — LANSOPRAZOLE 30 MG/1
30 CAPSULE, DELAYED RELEASE ORAL DAILY
COMMUNITY

## 2023-01-19 RX ORDER — LISINOPRIL 20 MG/1
20 TABLET ORAL DAILY
COMMUNITY
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
## 2023-01-20 ASSESSMENT — ENCOUNTER SYMPTOMS
ABDOMINAL PAIN: 1
VOMITING: 0
BACK PAIN: 0
NAUSEA: 0
ABDOMINAL DISTENTION: 0

## 2023-01-23 NOTE — TELEPHONE ENCOUNTER
Caller: SUMANTH    Relationship to patient:NURSE     Best call back number:[phone]    Chief complaint:APPT.    Type of visit: NEW PATIENT    Requested date:ASAP    If rescheduling, when is the original appointment:02/23/23    Additional notes:NURSE  SUMANTH CALLED AND IS WANTING TO SEE IF THEY CAN GET PT RESCHEDULED TO SEE THE APRN STEVEN HARRELL SOONER THAN SEEING -BUT THEY STILL WANT TO KEEP THE APPT. WITH  IN CASE THE PT WILL NEED TO SEE THE MD-THE  IS WANTING THE PT SEEN ASAP-SUMANTH STATED THAT OUR OFFICE MAY CALL THEM TO RESCHEDULE IF THE  OFFICE CAN NOT REACH THE PT-THANK YOU!

## 2023-01-24 ENCOUNTER — HOSPITAL ENCOUNTER (OUTPATIENT)
Dept: ULTRASOUND IMAGING | Age: 53
Discharge: HOME | End: 2023-01-24
Payer: COMMERCIAL

## 2023-01-24 DIAGNOSIS — K40.90 RIGHT INGUINAL HERNIA: ICD-10-CM

## 2023-01-24 DIAGNOSIS — R10.31 RIGHT GROIN PAIN: ICD-10-CM

## 2023-01-24 DIAGNOSIS — N50.811 RIGHT TESTICULAR PAIN: ICD-10-CM

## 2023-01-24 PROCEDURE — 76870 US EXAM SCROTUM: CPT

## 2023-01-25 NOTE — TELEPHONE ENCOUNTER
SUMANTH CALLED AND STATES SOMEONE CALLED HER FROM THE OFFICE BUT DID NOT LEAVE A VOICEMAIL.  NO COMMUNICATION IN CHART.  PLEASE CALL BACK AND OK TO LEAVE VM IF SHE DOES NOT ANSWER.

## 2023-01-26 NOTE — TELEPHONE ENCOUNTER
Placed a call to inform his is scheduled for the next tomasz appt with Daniel will be 01/30/23 at 12pm.

## 2023-01-30 ENCOUNTER — OFFICE VISIT (OUTPATIENT)
Dept: SURGERY | Age: 53
End: 2023-01-30

## 2023-01-30 VITALS
TEMPERATURE: 98.6 F | WEIGHT: 249.6 LBS | HEART RATE: 11 BPM | HEIGHT: 72 IN | BODY MASS INDEX: 33.81 KG/M2 | OXYGEN SATURATION: 97 %

## 2023-01-30 DIAGNOSIS — R10.31 RIGHT GROIN PAIN: Primary | ICD-10-CM
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
## 2023-02-02 DIAGNOSIS — I86.1 RIGHT VARICOCELE: Primary | ICD-10-CM

## 2023-02-02 ASSESSMENT — ENCOUNTER SYMPTOMS
EYE PAIN: 0
EYE REDNESS: 0
SHORTNESS OF BREATH: 0
ABDOMINAL PAIN: 0
COUGH: 0
SORE THROAT: 0
ABDOMINAL DISTENTION: 0

## 2023-02-06 ENCOUNTER — OFFICE VISIT (OUTPATIENT)
Dept: SURGERY | Age: 53
End: 2023-02-06

## 2023-02-06 ENCOUNTER — TELEPHONE (OUTPATIENT)
Dept: UROLOGY | Age: 53
End: 2023-02-06

## 2023-02-06 VITALS
TEMPERATURE: 97 F | SYSTOLIC BLOOD PRESSURE: 128 MMHG | HEIGHT: 72 IN | BODY MASS INDEX: 34.4 KG/M2 | WEIGHT: 254 LBS | DIASTOLIC BLOOD PRESSURE: 74 MMHG

## 2023-02-06 DIAGNOSIS — R10.31 RIGHT GROIN PAIN: Primary | ICD-10-CM

## 2023-02-06 NOTE — TELEPHONE ENCOUNTER
Pt called asking if it's possible for Urology office to take pt's CT disc down to General Surgery to Dr. Giles Ashton. Pt did go back to [facility] to get disc, however they gave him the wrong one. Pt lives about an hour away and states it would be difficult for him at this time to get away. Also, pt ask for office to contact if office is unable to do this or once disc has been sent to Gen Surg.   Thank you
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
## 2023-02-09 ENCOUNTER — TELEPHONE (OUTPATIENT)
Dept: VASCULAR SURGERY | Age: 53
End: 2023-02-09

## 2023-02-10 ENCOUNTER — OFFICE VISIT (OUTPATIENT)
Dept: VASCULAR SURGERY | Age: 53
End: 2023-02-10
Payer: COMMERCIAL

## 2023-02-10 VITALS
BODY MASS INDEX: 34.4 KG/M2 | SYSTOLIC BLOOD PRESSURE: 102 MMHG | DIASTOLIC BLOOD PRESSURE: 76 MMHG | HEART RATE: 91 BPM | WEIGHT: 254 LBS | OXYGEN SATURATION: 98 % | HEIGHT: 72 IN | TEMPERATURE: 98.6 F

## 2023-02-10 DIAGNOSIS — N50.811 PAIN IN RIGHT TESTICLE: ICD-10-CM

## 2023-02-10 DIAGNOSIS — I86.1 VARICOCELE: Primary | ICD-10-CM

## 2023-02-10 PROCEDURE — 99204 OFFICE O/P NEW MOD 45 MIN: CPT | Performed by: NURSE PRACTITIONER

## 2023-02-10 PROCEDURE — 3078F DIAST BP <80 MM HG: CPT | Performed by: NURSE PRACTITIONER

## 2023-02-10 PROCEDURE — 3074F SYST BP LT 130 MM HG: CPT | Performed by: NURSE PRACTITIONER
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
## 2023-02-15 ENCOUNTER — TELEPHONE (OUTPATIENT)
Dept: VASCULAR SURGERY | Age: 53
End: 2023-02-15

## 2023-02-15 NOTE — TELEPHONE ENCOUNTER
Rec'd call from [facility] stating they need c/o OV faxed when completed to [phone].

## 2023-02-16 ENCOUNTER — TELEPHONE (OUTPATIENT)
Dept: VASCULAR SURGERY | Age: 53
End: 2023-02-16

## 2023-02-16 DIAGNOSIS — Z01.818 PRE-OP TESTING: Primary | ICD-10-CM

## 2023-02-16 RX ORDER — SODIUM CHLORIDE 0.9 % (FLUSH) 0.9 %
5-40 SYRINGE (ML) INJECTION EVERY 12 HOURS SCHEDULED
OUTPATIENT
Start: 2023-02-16

## 2023-02-16 RX ORDER — SODIUM CHLORIDE 0.9 % (FLUSH) 0.9 %
5-40 SYRINGE (ML) INJECTION PRN
OUTPATIENT
Start: 2023-02-16

## 2023-02-16 RX ORDER — SODIUM CHLORIDE 9 MG/ML
INJECTION, SOLUTION INTRAVENOUS CONTINUOUS
OUTPATIENT
Start: 2023-02-16

## 2023-02-16 RX ORDER — ASPIRIN 81 MG/1
81 TABLET ORAL ONCE
OUTPATIENT
Start: 2023-02-16 | End: 2023-02-16

## 2023-02-16 RX ORDER — CLONIDINE HYDROCHLORIDE 0.1 MG/1
0.1 TABLET ORAL PRN
OUTPATIENT
Start: 2023-02-16

## 2023-02-16 RX ORDER — SODIUM CHLORIDE 9 MG/ML
INJECTION, SOLUTION INTRAVENOUS PRN
OUTPATIENT
Start: 2023-02-16
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
## 2023-02-16 NOTE — TELEPHONE ENCOUNTER
Spoke with the patient to let him know the following. Patient acknowledged. Kathi Herrera    Venogram Instructions    Report to the outpatient registration at [facility] on Friday, 03/03/2023 at 8:30 AM.  Nothing to eat or drink after midnight the night before the procedure. Please take all medications as normally scheduled to take, including heart and blood pressure medicines with a sip of water. Except Glipizide, and Glucophage. Do not take Lasix, insulin, or any diabetic medicine the morning of the procedure. If you take insulin, you may only take 1/2 of any scheduled nightly dose, and none the morning of the procedure. You may take all regularly scheduled heart, cholesterol, and blood pressure medicines with a sip of water. If you take Glucophage, Metformin, Actos Plus Met, or Glucovance,you can not take this the day of your procedure and two days after the procedure. You will need to have a lab test done two days after the procedure before you restart this medicine. If you have sleep apnea and require C-PAP, please bring this with you to the hospital.  Bring a list of all of your allergies and medications with you to the hospital.  Please let our nurse know if you have had an allergy to iodine, shellfish, or x-ray dye. Let the nurse know if you take any of the following:  Over the counter herbal supplements  Diclofenec, indomethacin, ketoprofen, Caridopa/levadopa, naproxen, sulindac, piroxicam, glucosamine, Chondrotin, cocchine, or methotrexate. Plan to stay at the hospital for 4 - 6 hours before being released  by the physician. You will need someone to drive you home after the procedure. Medications instructed to hold: See above. Please stop at your local [facility] or pharmacy and buy a bottle of Hibiclens. Wash thoroughly with this the night before and the morning of the procedure, paying special attention to the area that will be operated on. Make sure you rinse very well.  The Hibiclens should only be used prior to surgery. Please stop in the outpatient lab located in the [facility] the week prior to your scheduled procedure for outpatient labs. You will not need to be fasting prior to this appointment. The orders are there for you. 14. Other Directions: For any questions or concerns please contact our office        @ [phone] and ask to speak with Michelle Brice. 15.  You will need a  to take you home. 16.  Follow up appt: 03/21/2023 @ 9:30 AM.                Unless instructed otherwise by your physician, cleanse incision/puncture site twice daily with soap and water. Apply dry gauze. Do not get in tub. Okay to shower. Do not apply any salve, cream, peroxide or alcohol to the incision. Call with any increasing redness or drainage. Please Note:  If the patient is unable to sign his/her own paperwork, the appointed caregiver (POA, child, sibling, etc) must be present at the time of registration for all testing and procedures. Transportation to and form all testing and procedure appointments is the sole responsibility of the patient, caregiver, and/or nursing facility in which they reside. Please remember you will not be able to drive after you are discharged. Please call the office at [phone] with any questions or concerns. Please allow 48-72 hours notice for cancellations or rescheduling. We will attempt to accommodate your needs to the best of our capabilities, however, strict policies with procedure room availability does not allow much flexibility.

## 2023-02-23 ENCOUNTER — OFFICE VISIT (OUTPATIENT)
Dept: NEUROSURGERY | Facility: CLINIC | Age: 53
End: 2023-02-23
Payer: OTHER MISCELLANEOUS

## 2023-02-23 ENCOUNTER — HOSPITAL ENCOUNTER (OUTPATIENT)
Dept: GENERAL RADIOLOGY | Facility: HOSPITAL | Age: 53
Discharge: HOME | End: 2023-02-23
Payer: OTHER MISCELLANEOUS

## 2023-02-23 VITALS — BODY MASS INDEX: 34.51 KG/M2 | HEIGHT: 72 IN | WEIGHT: 254.8 LBS

## 2023-02-23 DIAGNOSIS — M54.2 CERVICALGIA: ICD-10-CM

## 2023-02-23 DIAGNOSIS — F17.200 CURRENT EVERY DAY SMOKER: ICD-10-CM

## 2023-02-23 DIAGNOSIS — M25.511 CHRONIC RIGHT SHOULDER PAIN: ICD-10-CM

## 2023-02-23 DIAGNOSIS — Z72.0 CHEWING TOBACCO USE: ICD-10-CM

## 2023-02-23 DIAGNOSIS — G89.29 CHRONIC RIGHT SHOULDER PAIN: ICD-10-CM

## 2023-02-23 DIAGNOSIS — M50.30 DEGENERATION OF CERVICAL INTERVERTEBRAL DISC: Primary | ICD-10-CM

## 2023-02-23 DIAGNOSIS — E66.09 CLASS 1 OBESITY DUE TO EXCESS CALORIES WITH BODY MASS INDEX (BMI) OF 34.0 TO 34.9 IN ADULT, UNSPECIFIED WHETHER SERIOUS COMORBIDITY PRESENT: ICD-10-CM

## 2023-02-23 DIAGNOSIS — M50.30 DEGENERATION OF CERVICAL INTERVERTEBRAL DISC: ICD-10-CM

## 2023-02-23 PROCEDURE — 99203 OFFICE O/P NEW LOW 30 MIN: CPT | Performed by: NEUROLOGICAL SURGERY

## 2023-02-23 RX ORDER — ROSUVASTATIN CALCIUM 20 MG/1
TABLET, COATED ORAL
COMMUNITY
Start: 2022-10-19

## 2023-02-23 RX ORDER — KETOROLAC TROMETHAMINE 10 MG/1
10 TABLET, FILM COATED ORAL EVERY 8 HOURS PRN
Qty: 15 TABLET | Refills: 0 | Status: SHIPPED | OUTPATIENT
Start: 2023-02-23

## 2023-02-23 RX ORDER — DULAGLUTIDE 1.5 MG/.5ML
INJECTION, SOLUTION SUBCUTANEOUS
COMMUNITY
Start: 2022-08-04

## 2023-02-23 RX ORDER — FAMOTIDINE 40 MG/1
TABLET, FILM COATED ORAL
COMMUNITY
Start: 2023-01-06

## 2023-02-23 RX ORDER — METHYLPREDNISOLONE 4 MG/1
TABLET ORAL
Qty: 21 TABLET | Refills: 0 | Status: SHIPPED | OUTPATIENT
Start: 2023-02-23

## 2023-02-23 RX ORDER — CYCLOBENZAPRINE HCL 10 MG
10 TABLET ORAL 3 TIMES DAILY PRN
Qty: 90 TABLET | Refills: 0 | Status: SHIPPED | OUTPATIENT
Start: 2023-02-23

## 2023-02-23 RX ORDER — GLIPIZIDE 10 MG/1
TABLET ORAL
COMMUNITY

## 2023-02-23 RX ORDER — ALBUTEROL SULFATE 90 UG/1
AEROSOL, METERED RESPIRATORY (INHALATION)
COMMUNITY
Start: 2022-10-19
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
## 2023-02-23 NOTE — PATIENT INSTRUCTIONS
"PATIENT TO CONTINUE TO FOLLOW UP WITH HIS PRIMARY CARE PROVIDER FOR YEARLY PHYSICAL EXAMS TO ENSURE COMPLETE HEALTH MAINTENANCE      BMI for Adults  What is BMI?  Body mass index (BMI) is a number that is calculated from a person's weight and height. BMI can help estimate how much of a person's weight is composed of fat. BMI does not measure body fat directly. Rather, it is an alternative to procedures that directly measure body fat, which can be difficult and expensive.  BMI can help identify people who may be at higher risk for certain medical problems.  What are BMI measurements used for?  BMI is used as a screening tool to identify possible weight problems. It helps determine whether a person is obese, overweight, a healthy weight, or underweight.  BMI is useful for:  Identifying a weight problem that may be related to a medical condition or may increase the risk for medical problems.  Promoting changes, such as changes in diet and exercise, to help reach a healthy weight. BMI screening can be repeated to see if these changes are working.  How is BMI calculated?  BMI involves measuring your weight in relation to your height. Both height and weight are measured, and the BMI is calculated from those numbers. This can be done either in English ([place] or metric measurements. Note that charts and online BMI calculators are available to help you find your BMI quickly and easily without having to do these calculations yourself.  To calculate your BMI in English ([place] measurements:    Measure your weight in pounds (lb).  Multiply the number of pounds by 703.  For example, for a person who weighs 180 lb, multiply that number by 703, which equals 126,540.  Measure your height in inches. Then multiply that number by itself to get a measurement called \"inches squared.\"  For example, for a person who is 70 inches tall, the \"inches squared\" measurement is 70 inches x 70 inches, which equals 4,900 inches squared.  Divide the " "total from step 2 (number of lb x 703) by the total from step 3 (inches squared): 126,540 ÷ 4,900 = 25.8. This is your BMI.  To calculate your BMI in metric measurements:  Measure your weight in kilograms (kg).  Measure your height in meters (m). Then multiply that number by itself to get a measurement called \"meters squared.\"  For example, for a person who is 1.75 m tall, the \"meters squared\" measurement is 1.75 m x 1.75 m, which is equal to 3.1 meters squared.  Divide the number of kilograms (your weight) by the meters squared number. In this example: 70 ÷ 3.1 = 22.6. This is your BMI.  What do the results mean?  BMI charts are used to identify whether you are underweight, normal weight, overweight, or obese. The following guidelines will be used:  Underweight: BMI less than 18.5.  Normal weight: BMI between 18.5 and 24.9.  Overweight: BMI between 25 and 29.9.  Obese: BMI of 30 or above.  Keep these notes in mind:  Weight includes both fat and muscle, so someone with a muscular build, such as an athlete, may have a BMI that is higher than 24.9. In cases like these, BMI is not an accurate measure of body fat.  To determine if excess body fat is the cause of a BMI of 25 or higher, further assessments may need to be done by a health care provider.  BMI is usually interpreted in the same way for men and women.  Where to find more information  For more information about BMI, including tools to quickly calculate your BMI, go to these websites:  Centers for Disease Control and Prevention: www.cdc.gov  American Heart Association: www.heart.org  National Heart, Lung, and Blood [place]: www.nhlbi.nih.gov  Summary  Body mass index (BMI) is a number that is calculated from a person's weight and height.  BMI may help estimate how much of a person's weight is composed of fat. BMI can help identify those who may be at higher risk for certain medical problems.  BMI can be measured using English measurements or metric " "measurements.  BMI charts are used to identify whether you are underweight, normal weight, overweight, or obese.  This information is not intended to replace advice given to you by your health care provider. Make sure you discuss any questions you have with your health care provider.  Document Revised: 09/09/2020 Document Reviewed: 07/17/2020  [employer] Patient Education © 2022 [employer]  DASH Eating Plan  DASH stands for Dietary Approaches to Stop Hypertension. The DASH eating plan is a healthy eating plan that has been shown to:  Reduce high blood pressure (hypertension).  Reduce your risk for type 2 diabetes, heart disease, and stroke.  Help with weight loss.  What are tips for following this plan?  Reading food labels  Check food labels for the amount of salt (sodium) per serving. Choose foods with less than 5 percent of the Daily Value of sodium. Generally, foods with less than 300 milligrams (mg) of sodium per serving fit into this eating plan.  To find whole grains, look for the word \"whole\" as the first word in the ingredient list.  Shopping  Buy products labeled as \"low-sodium\" or \"no salt added.\"  Buy fresh foods. Avoid canned foods and pre-made or frozen meals.  Cooking  Avoid adding salt when cooking. Use salt-free seasonings or herbs instead of table salt or sea salt. Check with your health care provider or pharmacist before using salt substitutes.  Do not denney foods. Cook foods using healthy methods such as baking, boiling, grilling, roasting, and broiling instead.  Cook with heart-healthy oils, such as olive, canola, avocado, soybean, or sunflower oil.  Meal planning    Eat a balanced diet that includes:  4 or more servings of fruits and 4 or more servings of vegetables each day. Try to fill one-half of your plate with fruits and vegetables.  6-8 servings of whole grains each day.  Less than 6 oz (170 g) of lean meat, poultry, or fish each day. A 3-oz (85-g) serving of meat is about the same size as " a deck of cards. One egg equals 1 oz (28 g).  2-3 servings of low-fat dairy each day. One serving is 1 cup (237 mL).  1 serving of nuts, seeds, or beans 5 times each week.  2-3 servings of heart-healthy fats. Healthy fats called omega-3 fatty acids are found in foods such as walnuts, flaxseeds, fortified milks, and eggs. These fats are also found in cold-water fish, such as sardines, salmon, and mackerel.  Limit how much you eat of:  Canned or prepackaged foods.  Food that is high in trans fat, such as some fried foods.  Food that is high in saturated fat, such as fatty meat.  Desserts and other sweets, sugary drinks, and other foods with added sugar.  Full-fat dairy products.  Do not salt foods before eating.  Do not eat more than 4 egg yolks a week.  Try to eat at least 2 vegetarian meals a week.  Eat more home-cooked food and less restaurant, buffet, and fast food.  Lifestyle  When eating at a restaurant, ask that your food be prepared with less salt or no salt, if possible.  If you drink alcohol:  Limit how much you use to:  0-1 drink a day for women who are not pregnant.  0-2 drinks a day for men.  Be aware of how much alcohol is in your drink. In the [place] one drink equals one 12 oz bottle of beer (355 mL), one 5 oz glass of wine (148 mL), or one 1½ oz glass of hard liquor (44 mL).  General information  Avoid eating more than 2,300 mg of salt a day. If you have hypertension, you may need to reduce your sodium intake to 1,500 mg a day.  Work with your health care provider to maintain a healthy body weight or to lose weight. Ask what an ideal weight is for you.  Get at least 30 minutes of exercise that causes your heart to beat faster (aerobic exercise) most days of the week. Activities may include walking, swimming, or biking.  Work with your health care provider or dietitian to adjust your eating plan to your individual calorie needs.  What foods should I eat?  Fruits  All fresh, dried, or frozen fruit. Canned  fruit in natural juice (without added sugar).  Vegetables  Fresh or frozen vegetables (raw, steamed, roasted, or grilled). Low-sodium or reduced-sodium tomato and vegetable juice. Low-sodium or reduced-sodium tomato sauce and tomato paste. Low-sodium or reduced-sodium canned vegetables.  Grains  Whole-grain or whole-wheat bread. Whole-grain or whole-wheat pasta. Brown rice. Oatmeal. Quinoa. Bulgur. Whole-grain and low-sodium cereals. Iliana bread. Low-fat, low-sodium crackers. Whole-wheat flour tortillas.  Meats and other proteins  Skinless chicken or turkey. Ground chicken or turkey. Pork with fat trimmed off. Fish and seafood. Egg whites. Dried beans, peas, or lentils. Unsalted nuts, nut butters, and seeds. Unsalted canned beans. Lean cuts of beef with fat trimmed off. Low-sodium, lean precooked or cured meat, such as sausages or meat loaves.  Dairy  Low-fat (1%) or fat-free (skim) milk. Reduced-fat, low-fat, or fat-free cheeses. Nonfat, low-sodium ricotta or cottage cheese. Low-fat or nonfat yogurt. Low-fat, low-sodium cheese.  Fats and oils  Soft margarine without trans fats. Vegetable oil. Reduced-fat, low-fat, or light mayonnaise and salad dressings (reduced-sodium). Canola, safflower, olive, avocado, soybean, and sunflower oils. Avocado.  Seasonings and condiments  Herbs. Spices. Seasoning mixes without salt.  Other foods  Unsalted popcorn and pretzels. Fat-free sweets.  The items listed above may not be a complete list of foods and beverages you can eat. Contact a dietitian for more information.  What foods should I avoid?  Fruits  Canned fruit in a light or heavy syrup. Fried fruit. Fruit in cream or butter sauce.  Vegetables  Creamed or fried vegetables. Vegetables in a cheese sauce. Regular canned vegetables (not low-sodium or reduced-sodium). Regular canned tomato sauce and paste (not low-sodium or reduced-sodium). Regular tomato and vegetable juice (not low-sodium or reduced-sodium). Pickles.  Olives.  Grains  Baked goods made with fat, such as croissants, muffins, or some breads. Dry pasta or rice meal packs.  Meats and other proteins  Fatty cuts of meat. Ribs. Fried meat. Bravo. Bologna, salami, and other precooked or cured meats, such as sausages or meat loaves. Fat from the back of a pig (fatback). Bratwurst. Salted nuts and seeds. Canned beans with added salt. Canned or smoked fish. Whole eggs or egg yolks. Chicken or turkey with skin.  Dairy  Whole or 2% milk, cream, and half-and-half. Whole or full-fat cream cheese. Whole-fat or sweetened yogurt. Full-fat cheese. Nondairy creamers. Whipped toppings. Processed cheese and cheese spreads.  Fats and oils  Butter. Stick margarine. Lard. Shortening. Ghee. Bravo fat. Tropical oils, such as coconut, palm kernel, or palm oil.  Seasonings and condiments  Onion salt, garlic salt, seasoned salt, table salt, and sea salt. [facility] sauce. Tartar sauce. Barbecue sauce. Teriyaki sauce. Soy sauce, including reduced-sodium. Steak sauce. Canned and packaged gravies. Fish sauce. Oyster sauce. Cocktail sauce. Store-bought horseradish. Ketchup. Mustard. Meat flavorings and tenderizers. Bouillon cubes. Hot sauces. Pre-made or packaged marinades. Pre-made or packaged taco seasonings. Relishes. Regular salad dressings.  Other foods  Salted popcorn and pretzels.  The items listed above may not be a complete list of foods and beverages you should avoid. Contact a dietitian for more information.  Where to find more information  National Heart, Lung, and Blood [place]: www.nhlbi.nih.gov  American Heart Association: www.heart.org  Academy of Nutrition and Dietetics: www.eatright.org  National Kidney Foundation: www.kidney.org  Summary  The DASH eating plan is a healthy eating plan that has been shown to reduce high blood pressure (hypertension). It may also reduce your risk for type 2 diabetes, heart disease, and stroke.  When on the DASH eating plan, aim to eat more  fresh fruits and vegetables, whole grains, lean proteins, low-fat dairy, and heart-healthy fats.  With the DASH eating plan, you should limit salt (sodium) intake to 2,300 mg a day. If you have hypertension, you may need to reduce your sodium intake to 1,500 mg a day.  Work with your health care provider or dietitian to adjust your eating plan to your individual calorie needs.  This information is not intended to replace advice given to you by your health care provider. Make sure you discuss any questions you have with your health care provider.  Document Revised: 11/20/2020 Document Reviewed: 11/20/2020  [employer] Patient Education © 2022 [employer]  Steps to Quit Smoking  Smoking tobacco is the leading cause of preventable death. It can affect almost every organ in the body. Smoking puts you and those around you at risk for developing many serious chronic diseases. Quitting smoking can be difficult, but it is one of the best things that you can do for your health. It is never too late to quit.  How do I get ready to quit?  When you decide to quit smoking, create a plan to help you succeed. Before you quit:  Pick a date to quit. Set a date within the next 2 weeks to give you time to prepare.  Write down the reasons why you are quitting. Keep this list in places where you will see it often.  Tell your family, friends, and co-workers that you are quitting. Support from your loved ones can make quitting easier.  Talk with your health care provider about your options for quitting smoking.  Find out what treatment options are covered by your health insurance.  Identify people, places, things, and activities that make you want to smoke (triggers). Avoid them.  What first steps can I take to quit smoking?  Throw away all cigarettes at home, at work, and in your car.  Throw away smoking accessories, such as ashtrays and lighters.  Clean your car. Make sure to empty the ashtray.  Clean your home, including curtains and  carpets.  What strategies can I use to quit smoking?  Talk with your health care provider about combining strategies, such as taking medicines while you are also receiving in-person counseling. Using these two strategies together makes you more likely to succeed in quitting than if you used either strategy on its own.  If you are pregnant or breastfeeding, talk with your health care provider about finding counseling or other support strategies to quit smoking. Do not take medicine to help you quit smoking unless your health care provider tells you to do so.  To quit smoking:  Quit right away  Quit smoking completely, instead of gradually reducing how much you smoke over a period of time. Research shows that stopping smoking right away is more successful than gradually quitting.  Attend in-person counseling to help you build problem-solving skills. You are more likely to succeed in quitting if you attend counseling sessions regularly. Even short sessions of 10 minutes can be effective.  Take medicine  You may take medicines to help you quit smoking. Some medicines require a prescription and some you can purchase over-the-counter. Medicines may have nicotine in them to replace the nicotine in cigarettes. Medicines may:  Help to stop cravings.  Help to relieve withdrawal symptoms.  Your health care provider may recommend:  Nicotine patches, gum, or lozenges.  Nicotine inhalers or sprays.  Non-nicotine medicine that is taken by mouth.  Find resources  Find resources and support systems that can help you to quit smoking and remain smoke-free after you quit. These resources are most helpful when you use them often. They include:  Online chats with a counselor.  Telephone quitlines.  Printed self-help materials.  Support groups or group counseling.  Text messaging programs.  Mobile phone apps or applications. Use apps that can help you stick to your quit plan by providing reminders, tips, and encouragement. There are many  free apps for mobile devices as well as websites. Examples include Quit Guide from the CDC and smokefree.gov  What things can I do to make it easier to quit?    Reach out to your family and friends for support and encouragement. Call telephone quitlines (2-695-QUIT-NOW), reach out to support groups, or work with a counselor for support.  Ask people who smoke to avoid smoking around you.  Avoid places that trigger you to smoke, such as bars, parties, or smoke-break areas at work.  Spend time with people who do not smoke.  Lessen the stress in your life. Stress can be a smoking trigger for some people. To lessen stress, try:  Exercising regularly.  Doing deep-breathing exercises.  Doing yoga.  Meditating.  Performing a body scan. This involves closing your eyes, scanning your body from head to toe, and noticing which parts of your body are particularly tense. Try to relax the muscles in those areas.  How will I feel when I quit smoking?  Day 1 to 3 weeks  Within the first 24 hours of quitting smoking, you may start to feel withdrawal symptoms. These symptoms are usually most noticeable 2-3 days after quitting, but they usually do not last for more than 2-3 weeks. You may experience these symptoms:  Mood swings.  Restlessness, anxiety, or irritability.  Trouble concentrating.  Dizziness.  Strong cravings for sugary foods and nicotine.  Mild weight gain.  Constipation.  Nausea.  Coughing or a sore throat.  Changes in how the medicines that you take for unrelated issues work in your body.  Depression.  Trouble sleeping (insomnia).  Week 3 and afterward  After the first 2-3 weeks of quitting, you may start to notice more positive results, such as:  Improved sense of smell and taste.  Decreased coughing and sore throat.  Slower heart rate.  Lower blood pressure.  Clearer skin.  The ability to breathe more easily.  Fewer sick days.  Quitting smoking can be very challenging. Do not get discouraged if you are not successful  the first time. Some people need to make many attempts to quit before they achieve long-term success. Do your best to stick to your quit plan, and talk with your health care provider if you have any questions or concerns.  Summary  Smoking tobacco is the leading cause of preventable death. Quitting smoking is one of the best things that you can do for your health.  When you decide to quit smoking, create a plan to help you succeed.  Quit smoking right away, not slowly over a period of time.  When you start quitting, seek help from your health care provider, family, or friends.  This information is not intended to replace advice given to you by your health care provider. Make sure you discuss any questions you have with your health care provider.  Document Revised: 08/26/2022 Document Reviewed: 03/07/2020  [employer] Patient Education © 2022 [employer]  Tobacco Use Disorder  Tobacco use disorder (TUD) occurs when a person craves, seeks, and uses tobacco, regardless of the consequences. This disorder can cause problems with mental and physical health. It can affect your ability to have healthy relationships, and it can keep you from meeting your responsibilities at work, home, or school.  Tobacco may be:  Smoked as a cigarette or cigar.  Inhaled using e-cigarettes.  Smoked in a pipe or hookah.  Chewed as smokeless tobacco.  Inhaled into the nostrils as snuff.  Tobacco products contain a dangerous chemical called nicotine, which is very addictive. Nicotine triggers hormones that make the body feel stimulated and works on areas of the brain that make you feel good. These effects can make it hard for people to quit nicotine.  Tobacco contains many other unsafe chemicals that can damage almost every organ in the body. Smoking tobacco also puts others in danger due to fire risk and possible health problems caused by breathing in secondhand smoke.  What are the signs or symptoms?  Symptoms of TUD may include:  Being unable  to slow down or stop your tobacco use.  Spending an abnormal amount of time getting or using tobacco.  Craving tobacco. Cravings may last for up to 6 months after quitting.  Tobacco use that:  Interferes with your work, school, or home life.  Interferes with your personal and social relationships.  Makes you give up activities that you once enjoyed or found important.  Using tobacco even though you know that it is:  Dangerous or bad for your health or someone else's health.  Causing problems in your life.  Needing more and more of the substance to get the same effect (developing tolerance).  Experiencing unpleasant symptoms if you do not use the substance (withdrawal). Withdrawal symptoms may include:  Depressed, anxious, or irritable mood.  Difficulty concentrating.  Increased appetite.  Restlessness or trouble sleeping.  Using the substance to avoid withdrawal.  How is this diagnosed?  This condition may be diagnosed based on:  Your current and past tobacco use. Your health care provider may ask questions about how your tobacco use affects your life.  A physical exam.  You may be diagnosed with TUD if you have at least two symptoms within a 12-month period.  How is this treated?  This condition is treated by stopping tobacco use. Many people are unable to quit on their own and need help. Treatment may include:  Nicotine replacement therapy (NRT). NRT provides nicotine without the other harmful chemicals in tobacco. NRT gradually lowers the dosage of nicotine in the body and reduces withdrawal symptoms. NRT is available as:  Over-the-counter gums, lozenges, and skin patches.  Prescription mouth inhalers and nasal sprays.  Medicine that acts on the brain to reduce cravings and withdrawal symptoms.  A type of talk therapy that examines your triggers for tobacco use, how to avoid them, and how to cope with cravings (behavioral therapy).  Hypnosis. This may help with withdrawal symptoms.  Joining a support group for  others coping with TUD.  The best treatment for TUD is usually a combination of medicine, talk therapy, and support groups. Recovery can be a long process. Many people start using tobacco again after stopping (relapse). If you relapse, it does not mean that treatment will not work.  Follow these instructions at home:  Lifestyle  Do not use any products that contain nicotine or tobacco, such as cigarettes and e-cigarettes.  Avoid things that trigger tobacco use as much as you can. Triggers include people and situations that usually cause you to use tobacco.  Avoid drinks that contain caffeine, including coffee. These may worsen some withdrawal symptoms.  Find ways to manage stress. Wanting to smoke may cause stress, and stress can make you want to smoke. Relaxation techniques such as deep breathing, meditation, and yoga may help.  Attend support groups as needed. These groups are an important part of long-term recovery for many people.  General instructions  Take over-the-counter and prescription medicines only as told by your health care provider.  Check with your health care provider before taking any new prescription or over-the-counter medicines.  Decide on a friend, family member, or smoking quit-line (such as 1-800-QUIT-NOW in the [place] that you can call or text when you feel the urge to smoke or when you need help coping with cravings.  Keep all follow-up visits as told by your health care provider and therapist. This is important.  Contact a health care provider if:  You are not able to take your medicines as prescribed.  Your symptoms get worse, even with treatment.  Summary  Tobacco use disorder (TUD) occurs when a person craves, seeks, and uses tobacco regardless of the consequences.  This condition may be diagnosed based on your current and past tobacco use and a physical exam.  Many people are unable to quit on their own and need help. Recovery can be a long process.  The most effective treatment for TUD  "is usually a combination of medicine, talk therapy, and support groups.  This information is not intended to replace advice given to you by your health care provider. Make sure you discuss any questions you have with your health care provider.  Document Revised: 08/26/2022 Document Reviewed: 11/09/2021  [employer] Patient Education © 2022 [employer]  Smokeless Tobacco Information, Adult  Tobacco is a leafy plant that contains a chemical called nicotine. Tobacco use is harmful to your health. Nicotine affects the chemicals in your brain, and this can cause you to crave nicotine. These cravings can lead to addiction. Smokeless tobacco is tobacco that you put in your mouth instead of smoking it. It may also be called chewing tobacco or snuff.  Smokeless tobacco is made from the leaves of tobacco plants and comes in many forms, such as:  Loose, dry leaves.  Plugs or twists.  Moist pouches.  Dissolving lozenges or strips.  Chewing, sucking, or holding the tobacco in your mouth causes your mouth to make more saliva. The saliva mixes with the tobacco to make tobacco \"juice\" that is swallowed or spit out.  How can smokeless tobacco use affect me?  All forms of tobacco contain many chemicals that can harm every organ in the body. Using smokeless tobacco increases your risk for:  Cancer. Tobacco use is one of the leading causes of cancer. Smokeless tobacco is linked to cancer of the mouth, esophagus, and pancreas.  Other long-term health problems, including high blood pressure, heart disease, and stroke.  Addiction.  Pregnancy complications. Pregnant women who use smokeless tobacco are more likely to have a miscarriage or deliver a baby too early (premature delivery).  Mouth and dental problems, such as:  Bad breath.  Teeth that look yellow or brown.  Mouth sores.  Cracking and bleeding lips.  Gum recession, gum disease, or tooth decay.  Lesions on the soft tissues of your mouth (leukoplakia).  The benefits of not using " smokeless tobacco include:  A healthy mind and body.  Saving money. You avoid the cost of buying tobacco and the cost of treating illnesses that are caused by tobacco.  What actions can I take to quit using tobacco?  Ask your health care provider for help to quit using smokeless tobacco. This may involve treatment.  These tips may also help you quit:  Pick a date to quit. Set a date within the next 2 weeks. This gives you time to prepare.  Write down the reasons why you are quitting. Keep this list in places where you will see it often, such as on your bathroom mirror or in your car or wallet.  Identify the people, places, things, and activities that make you want to use smokeless tobacco (triggers). Avoid them.  Tell your family and friends that you are quitting. Look for a support group in your area. Having support can make quitting easier.  Get rid of any tobacco you have and remove any tobacco smells. To do this:  Throw away all containers of tobacco at home, at work, and in your car.  Throw away any other items that you use regularly when you chew tobacco.  Clean your car and make sure to remove all tobacco-related items.  Clean your home, including curtains and carpets.  Keep track of how many days have passed since you quit. It may help to cross days off a calendar. Remembering how long and hard you have worked to quit can help you avoid using smokeless tobacco again.  Stay positive. Be prepared for cravings. It is common to slip up when you first quit, so take it one day at a time.  Stay busy and take care of your body. Get plenty of exercise. Drink enough water to keep your urine pale yellow.  Where to find support  Ask your health care provider if there is a local support group for quitting smokeless tobacco and any available online resources, such as:  Smoke Free: www.veterans.smokefree.gov  Be Tobacco Free: www.betobaccofree.hhs.gov  Tobacco Quitline: 2-886-QUIT-VET (1-305.107.4157).  Hotlines, such as  1-800-QUIT-NOW (1-916-087-6536).  Where to find more information  You can learn more about the risks of using smokeless tobacco and the benefits of quitting from these sources:  American Cancer Society: www.cancer.org  National Cancer Burton: www.cancer.gov  Centers for Disease Control and Prevention: www.cdc.gov  Food and Drug Administration: www.fda.gov/tobacco-products  Contact a health care provider if you have:  Trouble quitting smokeless tobacco use on your own.  White or other discolored patches in your mouth.  Difficulty swallowing.  A change in your voice.  Unexplained weight loss.  Stomach pain, nausea, or vomiting.  Summary  Smokeless tobacco contains many different chemicals that are known to cause cancer.  Nicotine is an addictive chemical in smokeless tobacco that affects your brain.  The benefits of not using smokeless tobacco include having a healthy mind and body and saving money.  Tell your family and friends that you are quitting. Having support can make quitting easier.  Ask your health care provider for help quitting smokeless tobacco. This may involve treatment.  This information is not intended to replace advice given to you by your health care provider. Make sure you discuss any questions you have with your health care provider.  Document Revised: 09/14/2022 Document Reviewed: 09/14/2022  Elsevier Patient Education © 2022 [employer]

## 2023-04-03 ENCOUNTER — HOSPITAL ENCOUNTER (OUTPATIENT)
Dept: MRI IMAGING | Facility: HOSPITAL | Age: 53
Discharge: HOME | End: 2023-04-03
Payer: OTHER MISCELLANEOUS

## 2023-04-03 ENCOUNTER — HOSPITAL ENCOUNTER (OUTPATIENT)
Dept: GENERAL RADIOLOGY | Facility: HOSPITAL | Age: 53
Discharge: HOME | End: 2023-04-03
Payer: OTHER MISCELLANEOUS

## 2023-04-03 DIAGNOSIS — M50.30 DISC DISEASE, DEGENERATIVE, CERVICAL: ICD-10-CM

## 2023-04-03 DIAGNOSIS — M50.30 DEGENERATION OF CERVICAL INTERVERTEBRAL DISC: ICD-10-CM

## 2023-04-03 DIAGNOSIS — M54.2 CERVICALGIA: ICD-10-CM

## 2023-04-03 PROCEDURE — 72052 X-RAY EXAM NECK SPINE 6/>VWS: CPT

## 2023-04-03 PROCEDURE — 72141 MRI NECK SPINE W/O DYE: CPT

## 2023-04-07 ENCOUNTER — TRANSCRIBE ORDERS (OUTPATIENT)
Dept: ADMINISTRATIVE | Facility: HOSPITAL | Age: 53
End: 2023-04-07
Payer: OTHER MISCELLANEOUS

## 2023-04-07 DIAGNOSIS — M25.521 RIGHT ELBOW PAIN: Primary | ICD-10-CM

## 2023-04-12 ENCOUNTER — TELEPHONE (OUTPATIENT)
Dept: NEUROSURGERY | Facility: CLINIC | Age: 53
End: 2023-04-12
Payer: OTHER MISCELLANEOUS

## 2023-04-12 NOTE — TELEPHONE ENCOUNTER
Called patient regarding his appt with Dr Cruz on 4/20/23 @ 400 pm.  I need to move his appt to a different day due to Dr Cruz being out of the office.  I left him a VM asking him if he could come on Tuesday 4/18/23 @ 11 am.    SANTINO CASH [facility]  PHYSICIAN LEAD  DR NYLA CRUZ  [facility]      IT IS OKAY FOR THE [facility] TO DELIVER THIS INFORMATION TO THE PATIENT IF THEY RECEIVE THIS CALL BACK    *IF [facility] SPEAKS TO PATIENT PLEASE LET SANTINO KNOW

## 2023-04-13 NOTE — TELEPHONE ENCOUNTER
I called the patient again today and he answered.  The new appt day & time was given and he expressed understanding.    SANTINO CASH [facility]  PHYSICIAN LEAD  DR NYLA CRUZ  [facility]

## 2023-04-18 ENCOUNTER — OFFICE VISIT (OUTPATIENT)
Dept: NEUROSURGERY | Facility: CLINIC | Age: 53
End: 2023-04-18
Payer: OTHER MISCELLANEOUS

## 2023-04-18 ENCOUNTER — HOSPITAL ENCOUNTER (OUTPATIENT)
Dept: MRI IMAGING | Facility: HOSPITAL | Age: 53
Discharge: HOME | End: 2023-04-18
Admitting: ORTHOPAEDIC SURGERY
Payer: OTHER MISCELLANEOUS

## 2023-04-18 VITALS — BODY MASS INDEX: 34.54 KG/M2 | WEIGHT: 255 LBS | HEIGHT: 72 IN

## 2023-04-18 DIAGNOSIS — Z72.0 CHEWING TOBACCO USE: ICD-10-CM

## 2023-04-18 DIAGNOSIS — F17.200 CURRENT EVERY DAY SMOKER: ICD-10-CM

## 2023-04-18 DIAGNOSIS — E66.09 CLASS 1 OBESITY DUE TO EXCESS CALORIES WITH BODY MASS INDEX (BMI) OF 34.0 TO 34.9 IN ADULT, UNSPECIFIED WHETHER SERIOUS COMORBIDITY PRESENT: ICD-10-CM

## 2023-04-18 DIAGNOSIS — R51.9 CHRONIC DAILY HEADACHE: ICD-10-CM

## 2023-04-18 DIAGNOSIS — M54.2 CERVICALGIA: ICD-10-CM

## 2023-04-18 DIAGNOSIS — M50.30 DEGENERATION OF CERVICAL INTERVERTEBRAL DISC: Primary | ICD-10-CM

## 2023-04-18 PROCEDURE — 73221 MRI JOINT UPR EXTREM W/O DYE: CPT

## 2023-04-18 RX ORDER — TRAMADOL HYDROCHLORIDE 50 MG/1
TABLET ORAL
COMMUNITY

## 2023-04-18 RX ORDER — ATORVASTATIN CALCIUM 40 MG/1
TABLET, FILM COATED ORAL
COMMUNITY

## 2023-04-18 NOTE — LETTER
April 18, 2023       No Recipients    Patient: Ghulam Wright .   YOB: 1970   Date of Visit: 4/18/2023       Dear KAILASH Singleton MD    Ghulam Wright was in my office today. Below is a copy of my note.    If you have questions, please do not hesitate to call me. I look forward to following Ghulam along with you.         Sincerely,        Juan Swanson MD        CC:   No Recipients    No notes on file
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
## 2023-04-18 NOTE — PATIENT INSTRUCTIONS
"PATIENT TO CONTINUE TO FOLLOW UP WITH HIS PRIMARY CARE PROVIDER FOR YEARLY PHYSICAL EXAMS TO ENSURE COMPLETE HEALTH MAINTENANCE    BMI for Adults  What is BMI?  Body mass index (BMI) is a number that is calculated from a person's weight and height. BMI can help estimate how much of a person's weight is composed of fat. BMI does not measure body fat directly. Rather, it is an alternative to procedures that directly measure body fat, which can be difficult and expensive.  BMI can help identify people who may be at higher risk for certain medical problems.  What are BMI measurements used for?  BMI is used as a screening tool to identify possible weight problems. It helps determine whether a person is obese, overweight, a healthy weight, or underweight.  BMI is useful for:  Identifying a weight problem that may be related to a medical condition or may increase the risk for medical problems.  Promoting changes, such as changes in diet and exercise, to help reach a healthy weight. BMI screening can be repeated to see if these changes are working.  How is BMI calculated?  BMI involves measuring your weight in relation to your height. Both height and weight are measured, and the BMI is calculated from those numbers. This can be done either in English ([place] or metric measurements. Note that charts and online BMI calculators are available to help you find your BMI quickly and easily without having to do these calculations yourself.  To calculate your BMI in English ([place] measurements:    Measure your weight in pounds (lb).  Multiply the number of pounds by 703.  For example, for a person who weighs 180 lb, multiply that number by 703, which equals 126,540.  Measure your height in inches. Then multiply that number by itself to get a measurement called \"inches squared.\"  For example, for a person who is 70 inches tall, the \"inches squared\" measurement is 70 inches x 70 inches, which equals 4,900 inches squared.  Divide the " "total from step 2 (number of lb x 703) by the total from step 3 (inches squared): 126,540 ÷ 4,900 = 25.8. This is your BMI.  To calculate your BMI in metric measurements:  Measure your weight in kilograms (kg).  Measure your height in meters (m). Then multiply that number by itself to get a measurement called \"meters squared.\"  For example, for a person who is 1.75 m tall, the \"meters squared\" measurement is 1.75 m x 1.75 m, which is equal to 3.1 meters squared.  Divide the number of kilograms (your weight) by the meters squared number. In this example: 70 ÷ 3.1 = 22.6. This is your BMI.  What do the results mean?  BMI charts are used to identify whether you are underweight, normal weight, overweight, or obese. The following guidelines will be used:  Underweight: BMI less than 18.5.  Normal weight: BMI between 18.5 and 24.9.  Overweight: BMI between 25 and 29.9.  Obese: BMI of 30 or above.  Keep these notes in mind:  Weight includes both fat and muscle, so someone with a muscular build, such as an athlete, may have a BMI that is higher than 24.9. In cases like these, BMI is not an accurate measure of body fat.  To determine if excess body fat is the cause of a BMI of 25 or higher, further assessments may need to be done by a health care provider.  BMI is usually interpreted in the same way for men and women.  Where to find more information  For more information about BMI, including tools to quickly calculate your BMI, go to these websites:  Centers for Disease Control and Prevention: www.cdc.gov  American Heart Association: www.heart.org  National Heart, Lung, and Blood [place]: www.nhlbi.nih.gov  Summary  Body mass index (BMI) is a number that is calculated from a person's weight and height.  BMI may help estimate how much of a person's weight is composed of fat. BMI can help identify those who may be at higher risk for certain medical problems.  BMI can be measured using English measurements or metric " "measurements.  BMI charts are used to identify whether you are underweight, normal weight, overweight, or obese.  This information is not intended to replace advice given to you by your health care provider. Make sure you discuss any questions you have with your health care provider.  Document Revised: 09/09/2020 Document Reviewed: 07/17/2020  [employer] Patient Education © 2022 [employer]  DASH Eating Plan  DASH stands for Dietary Approaches to Stop Hypertension. The DASH eating plan is a healthy eating plan that has been shown to:  Reduce high blood pressure (hypertension).  Reduce your risk for type 2 diabetes, heart disease, and stroke.  Help with weight loss.  What are tips for following this plan?  Reading food labels  Check food labels for the amount of salt (sodium) per serving. Choose foods with less than 5 percent of the Daily Value of sodium. Generally, foods with less than 300 milligrams (mg) of sodium per serving fit into this eating plan.  To find whole grains, look for the word \"whole\" as the first word in the ingredient list.  Shopping  Buy products labeled as \"low-sodium\" or \"no salt added.\"  Buy fresh foods. Avoid canned foods and pre-made or frozen meals.  Cooking  Avoid adding salt when cooking. Use salt-free seasonings or herbs instead of table salt or sea salt. Check with your health care provider or pharmacist before using salt substitutes.  Do not denney foods. Cook foods using healthy methods such as baking, boiling, grilling, roasting, and broiling instead.  Cook with heart-healthy oils, such as olive, canola, avocado, soybean, or sunflower oil.  Meal planning    Eat a balanced diet that includes:  4 or more servings of fruits and 4 or more servings of vegetables each day. Try to fill one-half of your plate with fruits and vegetables.  6-8 servings of whole grains each day.  Less than 6 oz (170 g) of lean meat, poultry, or fish each day. A 3-oz (85-g) serving of meat is about the same size as " a deck of cards. One egg equals 1 oz (28 g).  2-3 servings of low-fat dairy each day. One serving is 1 cup (237 mL).  1 serving of nuts, seeds, or beans 5 times each week.  2-3 servings of heart-healthy fats. Healthy fats called omega-3 fatty acids are found in foods such as walnuts, flaxseeds, fortified milks, and eggs. These fats are also found in cold-water fish, such as sardines, salmon, and mackerel.  Limit how much you eat of:  Canned or prepackaged foods.  Food that is high in trans fat, such as some fried foods.  Food that is high in saturated fat, such as fatty meat.  Desserts and other sweets, sugary drinks, and other foods with added sugar.  Full-fat dairy products.  Do not salt foods before eating.  Do not eat more than 4 egg yolks a week.  Try to eat at least 2 vegetarian meals a week.  Eat more home-cooked food and less restaurant, buffet, and fast food.  Lifestyle  When eating at a restaurant, ask that your food be prepared with less salt or no salt, if possible.  If you drink alcohol:  Limit how much you use to:  0-1 drink a day for women who are not pregnant.  0-2 drinks a day for men.  Be aware of how much alcohol is in your drink. In the [place] one drink equals one 12 oz bottle of beer (355 mL), one 5 oz glass of wine (148 mL), or one 1½ oz glass of hard liquor (44 mL).  General information  Avoid eating more than 2,300 mg of salt a day. If you have hypertension, you may need to reduce your sodium intake to 1,500 mg a day.  Work with your health care provider to maintain a healthy body weight or to lose weight. Ask what an ideal weight is for you.  Get at least 30 minutes of exercise that causes your heart to beat faster (aerobic exercise) most days of the week. Activities may include walking, swimming, or biking.  Work with your health care provider or dietitian to adjust your eating plan to your individual calorie needs.  What foods should I eat?  Fruits  All fresh, dried, or frozen fruit. Canned  fruit in natural juice (without added sugar).  Vegetables  Fresh or frozen vegetables (raw, steamed, roasted, or grilled). Low-sodium or reduced-sodium tomato and vegetable juice. Low-sodium or reduced-sodium tomato sauce and tomato paste. Low-sodium or reduced-sodium canned vegetables.  Grains  Whole-grain or whole-wheat bread. Whole-grain or whole-wheat pasta. Brown rice. Oatmeal. Quinoa. Bulgur. Whole-grain and low-sodium cereals. [place]iana bread. Low-fat, low-sodium crackers. Whole-wheat flour tortillas.  Meats and other proteins  Skinless chicken or turkey. Ground chicken or turkey. Pork with fat trimmed off. Fish and seafood. Egg whites. Dried beans, peas, or lentils. Unsalted nuts, nut butters, and seeds. Unsalted canned beans. Lean cuts of beef with fat trimmed off. Low-sodium, lean precooked or cured meat, such as sausages or meat loaves.  Dairy  Low-fat (1%) or fat-free (skim) milk. Reduced-fat, low-fat, or fat-free cheeses. Nonfat, low-sodium ricotta or cottage cheese. Low-fat or nonfat yogurt. Low-fat, low-sodium cheese.  Fats and oils  Soft margarine without trans fats. Vegetable oil. Reduced-fat, low-fat, or light mayonnaise and salad dressings (reduced-sodium). Canola, safflower, olive, avocado, soybean, and sunflower oils. Avocado.  Seasonings and condiments  Herbs. Spices. Seasoning mixes without salt.  Other foods  Unsalted popcorn and pretzels. Fat-free sweets.  The items listed above may not be a complete list of foods and beverages you can eat. Contact a dietitian for more information.  What foods should I avoid?  Fruits  Canned fruit in a light or heavy syrup. Fried fruit. Fruit in cream or butter sauce.  Vegetables  Creamed or fried vegetables. Vegetables in a cheese sauce. Regular canned vegetables (not low-sodium or reduced-sodium). Regular canned tomato sauce and paste (not low-sodium or reduced-sodium). Regular tomato and vegetable juice (not low-sodium or reduced-sodium). Pickles.  Olives.  Grains  Baked goods made with fat, such as croissants, muffins, or some breads. Dry pasta or rice meal packs.  Meats and other proteins  Fatty cuts of meat. Ribs. Fried meat. Bravo. Bologna, salami, and other precooked or cured meats, such as sausages or meat loaves. Fat from the back of a pig (fatback). Bratwurst. Salted nuts and seeds. Canned beans with added salt. Canned or smoked fish. Whole eggs or egg yolks. Chicken or turkey with skin.  Dairy  Whole or 2% milk, cream, and half-and-half. Whole or full-fat cream cheese. Whole-fat or sweetened yogurt. Full-fat cheese. Nondairy creamers. Whipped toppings. Processed cheese and cheese spreads.  Fats and oils  Butter. Stick margarine. Lard. Shortening. Ghee. Bravo fat. Tropical oils, such as coconut, palm kernel, or palm oil.  Seasonings and condiments  Onion salt, garlic salt, seasoned salt, table salt, and sea salt. [employer] [place] sauce. Tartar sauce. Barbecue sauce. Teriyaki sauce. Soy sauce, including reduced-sodium. Steak sauce. Canned and packaged gravies. Fish sauce. Oyster sauce. Cocktail sauce. Store-bought horseradish. Ketchup. Mustard. Meat flavorings and tenderizers. Bouillon cubes. Hot sauces. Pre-made or packaged marinades. Pre-made or packaged taco seasonings. Relishes. Regular salad dressings.  Other foods  Salted popcorn and pretzels.  The items listed above may not be a complete list of foods and beverages you should avoid. Contact a dietitian for more information.  Where to find more information  National Heart, Lung, and Blood [place]: www.nhlbi.nih.gov  American Heart Association: www.heart.org  Academy of Nutrition and Dietetics: www.eatright.org  National Kidney Foundation: www.kidney.org  Summary  The DASH eating plan is a healthy eating plan that has been shown to reduce high blood pressure (hypertension). It may also reduce your risk for type 2 diabetes, heart disease, and stroke.  When on the DASH eating plan, aim to eat more  fresh fruits and vegetables, whole grains, lean proteins, low-fat dairy, and heart-healthy fats.  With the DASH eating plan, you should limit salt (sodium) intake to 2,300 mg a day. If you have hypertension, you may need to reduce your sodium intake to 1,500 mg a day.  Work with your health care provider or dietitian to adjust your eating plan to your individual calorie needs.  This information is not intended to replace advice given to you by your health care provider. Make sure you discuss any questions you have with your health care provider.  Document Revised: 11/20/2020 Document Reviewed: 11/20/2020  [employer] Patient Education © 2022 [employer]  Steps to Quit Smoking  Smoking tobacco is the leading cause of preventable death. It can affect almost every organ in the body. Smoking puts you and those around you at risk for developing many serious chronic diseases. Quitting smoking can be difficult, but it is one of the best things that you can do for your health. It is never too late to quit.  How do I get ready to quit?  When you decide to quit smoking, create a plan to help you succeed. Before you quit:  Pick a date to quit. Set a date within the next 2 weeks to give you time to prepare.  Write down the reasons why you are quitting. Keep this list in places where you will see it often.  Tell your family, friends, and co-workers that you are quitting. Support from your loved ones can make quitting easier.  Talk with your health care provider about your options for quitting smoking.  Find out what treatment options are covered by your health insurance.  Identify people, places, things, and activities that make you want to smoke (triggers). Avoid them.  What first steps can I take to quit smoking?  Throw away all cigarettes at home, at work, and in your car.  Throw away smoking accessories, such as ashtrays and lighters.  Clean your car. Make sure to empty the ashtray.  Clean your home, including curtains and  carpets.  What strategies can I use to quit smoking?  Talk with your health care provider about combining strategies, such as taking medicines while you are also receiving in-person counseling. Using these two strategies together makes you more likely to succeed in quitting than if you used either strategy on its own.  If you are pregnant or breastfeeding, talk with your health care provider about finding counseling or other support strategies to quit smoking. Do not take medicine to help you quit smoking unless your health care provider tells you to do so.  To quit smoking:  Quit right away  Quit smoking completely, instead of gradually reducing how much you smoke over a period of time. Research shows that stopping smoking right away is more successful than gradually quitting.  Attend in-person counseling to help you build problem-solving skills. You are more likely to succeed in quitting if you attend counseling sessions regularly. Even short sessions of 10 minutes can be effective.  Take medicine  You may take medicines to help you quit smoking. Some medicines require a prescription and some you can purchase over-the-counter. Medicines may have nicotine in them to replace the nicotine in cigarettes. Medicines may:  Help to stop cravings.  Help to relieve withdrawal symptoms.  Your health care provider may recommend:  Nicotine patches, gum, or lozenges.  Nicotine inhalers or sprays.  Non-nicotine medicine that is taken by mouth.  Find resources  Find resources and support systems that can help you to quit smoking and remain smoke-free after you quit. These resources are most helpful when you use them often. They include:  Online chats with a counselor.  Telephone quitlines.  Printed self-help materials.  Support groups or group counseling.  Text messaging programs.  Mobile phone apps or applications. Use apps that can help you stick to your quit plan by providing reminders, tips, and encouragement. There are many  free apps for mobile devices as well as websites. Examples include Quit Guide from the CDC and smokefree.gov  What things can I do to make it easier to quit?    Reach out to your family and friends for support and encouragement. Call telephone quitlines (5-842-QUIT-NOW), reach out to support groups, or work with a counselor for support.  Ask people who smoke to avoid smoking around you.  Avoid places that trigger you to smoke, such as bars, parties, or smoke-break areas at work.  Spend time with people who do not smoke.  Lessen the stress in your life. Stress can be a smoking trigger for some people. To lessen stress, try:  Exercising regularly.  Doing deep-breathing exercises.  Doing yoga.  Meditating.  Performing a body scan. This involves closing your eyes, scanning your body from head to toe, and noticing which parts of your body are particularly tense. Try to relax the muscles in those areas.  How will I feel when I quit smoking?  Day 1 to 3 weeks  Within the first 24 hours of quitting smoking, you may start to feel withdrawal symptoms. These symptoms are usually most noticeable 2-3 days after quitting, but they usually do not last for more than 2-3 weeks. You may experience these symptoms:  Mood swings.  Restlessness, anxiety, or irritability.  Trouble concentrating.  Dizziness.  Strong cravings for sugary foods and nicotine.  Mild weight gain.  Constipation.  Nausea.  Coughing or a sore throat.  Changes in how the medicines that you take for unrelated issues work in your body.  Depression.  Trouble sleeping (insomnia).  Week 3 and afterward  After the first 2-3 weeks of quitting, you may start to notice more positive results, such as:  Improved sense of smell and taste.  Decreased coughing and sore throat.  Slower heart rate.  Lower blood pressure.  Clearer skin.  The ability to breathe more easily.  Fewer sick days.  Quitting smoking can be very challenging. Do not get discouraged if you are not successful  the first time. Some people need to make many attempts to quit before they achieve long-term success. Do your best to stick to your quit plan, and talk with your health care provider if you have any questions or concerns.  Summary  Smoking tobacco is the leading cause of preventable death. Quitting smoking is one of the best things that you can do for your health.  When you decide to quit smoking, create a plan to help you succeed.  Quit smoking right away, not slowly over a period of time.  When you start quitting, seek help from your health care provider, family, or friends.  This information is not intended to replace advice given to you by your health care provider. Make sure you discuss any questions you have with your health care provider.  Document Revised: 08/26/2022 Document Reviewed: 03/07/2020  [employer] Patient Education © 2022 [employer]  Tobacco Use Disorder  Tobacco use disorder (TUD) occurs when a person craves, seeks, and uses tobacco, regardless of the consequences. This disorder can cause problems with mental and physical health. It can affect your ability to have healthy relationships, and it can keep you from meeting your responsibilities at work, home, or school.  Tobacco may be:  Smoked as a cigarette or cigar.  Inhaled using e-cigarettes.  Smoked in a pipe or hookah.  Chewed as smokeless tobacco.  Inhaled into the nostrils as snuff.  Tobacco products contain a dangerous chemical called nicotine, which is very addictive. Nicotine triggers hormones that make the body feel stimulated and works on areas of the brain that make you feel good. These effects can make it hard for people to quit nicotine.  Tobacco contains many other unsafe chemicals that can damage almost every organ in the body. Smoking tobacco also puts others in danger due to fire risk and possible health problems caused by breathing in secondhand smoke.  What are the signs or symptoms?  Symptoms of TUD may include:  Being unable  to slow down or stop your tobacco use.  Spending an abnormal amount of time getting or using tobacco.  Craving tobacco. Cravings may last for up to 6 months after quitting.  Tobacco use that:  Interferes with your work, school, or home life.  Interferes with your personal and social relationships.  Makes you give up activities that you once enjoyed or found important.  Using tobacco even though you know that it is:  Dangerous or bad for your health or someone else's health.  Causing problems in your life.  Needing more and more of the substance to get the same effect (developing tolerance).  Experiencing unpleasant symptoms if you do not use the substance (withdrawal). Withdrawal symptoms may include:  Depressed, anxious, or irritable mood.  Difficulty concentrating.  Increased appetite.  Restlessness or trouble sleeping.  Using the substance to avoid withdrawal.  How is this diagnosed?  This condition may be diagnosed based on:  Your current and past tobacco use. Your health care provider may ask questions about how your tobacco use affects your life.  A physical exam.  You may be diagnosed with TUD if you have at least two symptoms within a 12-month period.  How is this treated?  This condition is treated by stopping tobacco use. Many people are unable to quit on their own and need help. Treatment may include:  Nicotine replacement therapy (NRT). NRT provides nicotine without the other harmful chemicals in tobacco. NRT gradually lowers the dosage of nicotine in the body and reduces withdrawal symptoms. NRT is available as:  Over-the-counter gums, lozenges, and skin patches.  Prescription mouth inhalers and nasal sprays.  Medicine that acts on the brain to reduce cravings and withdrawal symptoms.  A type of talk therapy that examines your triggers for tobacco use, how to avoid them, and how to cope with cravings (behavioral therapy).  Hypnosis. This may help with withdrawal symptoms.  Joining a support group for  others coping with TUD.  The best treatment for TUD is usually a combination of medicine, talk therapy, and support groups. Recovery can be a long process. Many people start using tobacco again after stopping (relapse). If you relapse, it does not mean that treatment will not work.  Follow these instructions at home:  Lifestyle  Do not use any products that contain nicotine or tobacco, such as cigarettes and e-cigarettes.  Avoid things that trigger tobacco use as much as you can. Triggers include people and situations that usually cause you to use tobacco.  Avoid drinks that contain caffeine, including coffee. These may worsen some withdrawal symptoms.  Find ways to manage stress. Wanting to smoke may cause stress, and stress can make you want to smoke. Relaxation techniques such as deep breathing, meditation, and yoga may help.  Attend support groups as needed. These groups are an important part of long-term recovery for many people.  General instructions  Take over-the-counter and prescription medicines only as told by your health care provider.  Check with your health care provider before taking any new prescription or over-the-counter medicines.  Decide on a friend, family member, or smoking quit-line (such as 1-800-QUIT-NOW in the [place] that you can call or text when you feel the urge to smoke or when you need help coping with cravings.  Keep all follow-up visits as told by your health care provider and therapist. This is important.  Contact a health care provider if:  You are not able to take your medicines as prescribed.  Your symptoms get worse, even with treatment.  Summary  Tobacco use disorder (TUD) occurs when a person craves, seeks, and uses tobacco regardless of the consequences.  This condition may be diagnosed based on your current and past tobacco use and a physical exam.  Many people are unable to quit on their own and need help. Recovery can be a long process.  The most effective treatment for TUD  "is usually a combination of medicine, talk therapy, and support groups.  This information is not intended to replace advice given to you by your health care provider. Make sure you discuss any questions you have with your health care provider.  Document Revised: 08/26/2022 Document Reviewed: 11/09/2021  [employer] Patient Education © 2022 [employer]  Smokeless Tobacco Information, Adult  Tobacco is a leafy plant that contains a chemical called nicotine. Tobacco use is harmful to your health. Nicotine affects the chemicals in your brain, and this can cause you to crave nicotine. These cravings can lead to addiction. Smokeless tobacco is tobacco that you put in your mouth instead of smoking it. It may also be called chewing tobacco or snuff.  Smokeless tobacco is made from the leaves of tobacco plants and comes in many forms, such as:  Loose, dry leaves.  Plugs or twists.  Moist pouches.  Dissolving lozenges or strips.  Chewing, sucking, or holding the tobacco in your mouth causes your mouth to make more saliva. The saliva mixes with the tobacco to make tobacco \"juice\" that is swallowed or spit out.  How can smokeless tobacco use affect me?  All forms of tobacco contain many chemicals that can harm every organ in the body. Using smokeless tobacco increases your risk for:  Cancer. Tobacco use is one of the leading causes of cancer. Smokeless tobacco is linked to cancer of the mouth, esophagus, and pancreas.  Other long-term health problems, including high blood pressure, heart disease, and stroke.  Addiction.  Pregnancy complications. Pregnant women who use smokeless tobacco are more likely to have a miscarriage or deliver a baby too early (premature delivery).  Mouth and dental problems, such as:  Bad breath.  Teeth that look yellow or brown.  Mouth sores.  Cracking and bleeding lips.  Gum recession, gum disease, or tooth decay.  Lesions on the soft tissues of your mouth (leukoplakia).  The benefits of not using " smokeless tobacco include:  A healthy mind and body.  Saving money. You avoid the cost of buying tobacco and the cost of treating illnesses that are caused by tobacco.  What actions can I take to quit using tobacco?  Ask your health care provider for help to quit using smokeless tobacco. This may involve treatment.  These tips may also help you quit:  Pick a date to quit. Set a date within the next 2 weeks. This gives you time to prepare.  Write down the reasons why you are quitting. Keep this list in places where you will see it often, such as on your bathroom mirror or in your car or wallet.  Identify the people, places, things, and activities that make you want to use smokeless tobacco (triggers). Avoid them.  Tell your family and friends that you are quitting. Look for a support group in your area. Having support can make quitting easier.  Get rid of any tobacco you have and remove any tobacco smells. To do this:  Throw away all containers of tobacco at home, at work, and in your car.  Throw away any other items that you use regularly when you chew tobacco.  Clean your car and make sure to remove all tobacco-related items.  Clean your home, including curtains and carpets.  Keep track of how many days have passed since you quit. It may help to cross days off a calendar. Remembering how long and hard you have worked to quit can help you avoid using smokeless tobacco again.  Stay positive. Be prepared for cravings. It is common to slip up when you first quit, so take it one day at a time.  Stay busy and take care of your body. Get plenty of exercise. Drink enough water to keep your urine pale yellow.  Where to find support  Ask your health care provider if there is a local support group for quitting smokeless tobacco and any available online resources, such as:  Smoke Free: www.veterans.smokefree.gov  Be Tobacco Free: www.betobaccofree.hhs.gov  Tobacco Quitline: 8-994-QUIT-VET (1-516.634.4925).  Hotlines, such as  1-800-QUIT-NOW (7-383-936-6096).  Where to find more information  You can learn more about the risks of using smokeless tobacco and the benefits of quitting from these sources:  American Cancer Society: www.cancer.org  National Cancer [place]: www.cancer.gov  Centers for Disease Control and Prevention: www.cdc.gov  Food and Drug Administration: www.fda.gov/tobacco-products  Contact a health care provider if you have:  Trouble quitting smokeless tobacco use on your own.  White or other discolored patches in your mouth.  Difficulty swallowing.  A change in your voice.  Unexplained weight loss.  Stomach pain, nausea, or vomiting.  Summary  Smokeless tobacco contains many different chemicals that are known to cause cancer.  Nicotine is an addictive chemical in smokeless tobacco that affects your brain.  The benefits of not using smokeless tobacco include having a healthy mind and body and saving money.  Tell your family and friends that you are quitting. Having support can make quitting easier.  Ask your health care provider for help quitting smokeless tobacco. This may involve treatment.  This information is not intended to replace advice given to you by your health care provider. Make sure you discuss any questions you have with your health care provider.  Document Revised: 09/14/2022 Document Reviewed: 09/14/2022  Elsevier Patient Education © 2022 Elsevier Inc.

## 2023-04-19 ENCOUNTER — TELEPHONE (OUTPATIENT)
Dept: NEUROSURGERY | Facility: CLINIC | Age: 53
End: 2023-04-19
Payer: OTHER MISCELLANEOUS

## 2023-04-19 NOTE — TELEPHONE ENCOUNTER
I received a call from Bianca Lynn yesterday and they both needed copies of patient's office note from 4/18/23.  They did request that it state the patient is at MMI from a neurosurgical standpoint if Dr Cruz is comfortable stating that.  I told Leah I would check with him to see if he would do an addendum and once that is complete would have Kayley get that to her.  She expressed understanding.    SANTINO CASH [facility]  PHYSICIAN LEAD  DR NYLA CRUZ  [facility] NEUROSURGERY

## 2023-04-20 NOTE — TELEPHONE ENCOUNTER
Received a call from [facility] with some questions:    Patient only did 2 therapy sessions and was discharged b/c of non-compliance.  She wants to make sure Dr Cruz is aware of this.    I told her that we are aware of non-compliance but Dr Cruz wants it documented that we have given him the recommendations for therapy & pain mgmt and he must complete them.      She is going to review the records and try to get the recommendations approved.    SANTINO CASH [facility]  PHYSICIAN LEAD  DR NYLA CRUZ  [place] NEUROSURGERY

## 2023-04-20 NOTE — TELEPHONE ENCOUNTER
I spoke w/Dr Cruz and he is not going to put down the patient is at MMI because he has recommended the patient participate in therapy and pain mgmt.    I called Leah back to let her know this but she didn't answer so I left her a VM.    I am forwarding this message to Kayley so she can forward the patient's last office note to Leah.    SANTINO CASH [facility]  PHYSICIAN LEAD  DR NYLA CRUZ  [facility] NEUROSURGERY

## 2023-05-02 ENCOUNTER — TELEPHONE (OUTPATIENT)
Dept: NEUROSURGERY | Facility: CLINIC | Age: 53
End: 2023-05-02
Payer: OTHER MISCELLANEOUS

## 2023-05-02 NOTE — TELEPHONE ENCOUNTER
"Received a call from Leah with patient's work comp stating the physical therapy & pain mgmt referrals have been denied.  The reason for the denial is that the patient has degenerative changes on his imaging and therefore these were not \"caused\" by the work injury.    She had requested an appointment with Dr Cruz; however, after talking to him he states there is nothing he is going to offer the patient from a neurosurgical standpoint.      Leah states that since we are not going to see the patient again and the referrals have been denied they need a statement from Dr Cruz in regards to his neck.  She states something to the effect that the patient is at MMI from a neurosurgical standpoint in relationship to his neck (which is why he was referred to our office).    I told her I will have to talk to Dr Cruz and get back with her on this.  She understood.    SANTINO CASH [facility]  PHYSICIAN LEAD  DR NYLA CRUZ  [facility]    "

## 2023-05-08 NOTE — TELEPHONE ENCOUNTER
SUMANTH FROM  CALLED TO CHECK THE STATUS OF NOTES THAT WERE PREVIOUSLY DISCUSSED.  PLEASE CALL SUMANTH WITH ANY QUESTIONS @ PHONE #[phone]    FAX# [phone]

## 2023-05-09 NOTE — TELEPHONE ENCOUNTER
Dr Cruz says he is not dictating a letter of MMI due to the work comp denying his recommendations.    SANTINO CASH [facility]  PHYSICIAN LEAD  DR NYLA CRUZ  [facility] NEUROSURGERY      IT IS OKAY FOR THE [facility] TO DELIVER THIS INFORMATION TO THE PATIENT IF THEY RECEIVE THIS CALL BACK

## 2023-05-15 ENCOUNTER — TELEPHONE (OUTPATIENT)
Dept: VASCULAR SURGERY | Age: 53
End: 2023-05-15

## 2023-05-15 NOTE — TELEPHONE ENCOUNTER
The patient called in last week to discuss a venogram that was scheduled for March, 2023. The patient was trying to get this approved through workman's comp and it wasn't approved. The patient requested another appointment to discuss paying for the surgery with his current insurance. I have him an appt for Thursday, May 18, 2023 @ 1:30 PM to discuss with [facility]. The patient is aware.

## 2023-05-18 ENCOUNTER — TELEPHONE (OUTPATIENT)
Dept: VASCULAR SURGERY | Age: 53
End: 2023-05-18

## 2023-05-18 ENCOUNTER — OFFICE VISIT (OUTPATIENT)
Dept: VASCULAR SURGERY | Age: 53
End: 2023-05-18

## 2023-05-18 VITALS
TEMPERATURE: 96.6 F | OXYGEN SATURATION: 97 % | DIASTOLIC BLOOD PRESSURE: 89 MMHG | WEIGHT: 254 LBS | SYSTOLIC BLOOD PRESSURE: 160 MMHG | HEIGHT: 72 IN | HEART RATE: 102 BPM | BODY MASS INDEX: 34.4 KG/M2

## 2023-05-18 DIAGNOSIS — Z01.818 PRE-OP TESTING: Primary | ICD-10-CM

## 2023-05-18 DIAGNOSIS — I86.1 VARICOCELE: ICD-10-CM

## 2023-05-18 DIAGNOSIS — N50.811 PAIN IN RIGHT TESTICLE: Primary | ICD-10-CM

## 2023-05-18 DIAGNOSIS — Z01.818 PRE-OP TESTING: ICD-10-CM

## 2023-05-18 LAB
ANION GAP SERPL CALCULATED.3IONS-SCNC: 12 MMOL/L (ref 7–19)
BUN SERPL-MCNC: 18 MG/DL (ref 6–20)
CALCIUM SERPL-MCNC: 10 MG/DL (ref 8.6–10)
CHLORIDE SERPL-SCNC: 95 MMOL/L (ref 98–111)
CO2 SERPL-SCNC: 27 MMOL/L (ref 22–29)
CREAT SERPL-MCNC: 1 MG/DL (ref 0.5–1.2)
ERYTHROCYTE [DISTWIDTH] IN BLOOD BY AUTOMATED COUNT: 12.2 % (ref 11.5–14.5)
GLUCOSE SERPL-MCNC: 493 MG/DL (ref 74–109)
HCT VFR BLD AUTO: 50 % (ref 42–52)
HGB BLD-MCNC: 17.8 G/DL (ref 14–18)
MCH RBC QN AUTO: 32.4 PG (ref 27–31)
MCHC RBC AUTO-ENTMCNC: 35.6 G/DL (ref 33–37)
MCV RBC AUTO: 91.1 FL (ref 80–94)
PLATELET # BLD AUTO: 222 K/UL (ref 130–400)
PMV BLD AUTO: 11.7 FL (ref 9.4–12.4)
POTASSIUM SERPL-SCNC: 4.9 MMOL/L (ref 3.5–5)
RBC # BLD AUTO: 5.49 M/UL (ref 4.7–6.1)
SODIUM SERPL-SCNC: 134 MMOL/L (ref 136–145)
WBC # BLD AUTO: 11 K/UL (ref 4.8–10.8)

## 2023-05-18 PROCEDURE — 3074F SYST BP LT 130 MM HG: CPT | Performed by: NURSE PRACTITIONER

## 2023-05-18 PROCEDURE — 3078F DIAST BP <80 MM HG: CPT | Performed by: NURSE PRACTITIONER

## 2023-05-18 PROCEDURE — 99213 OFFICE O/P EST LOW 20 MIN: CPT | Performed by: NURSE PRACTITIONER

## 2023-05-18 NOTE — TELEPHONE ENCOUNTER
Spoke with the patient to let him know the following. The patient acknowledged and knows he needs to bring his insurance card. Rosalba Gaffney    Venogram Instructions    Report to the outpatient registration at [facility] on Friday June 2, 2023 @ 6:00 AM.   Nothing to eat or drink after midnight the night before the procedure. Please take all medications as normally scheduled to take, including heart and blood pressure medicines with a sip of water. Except Glipizide and Glucophage. Do not take the Glucophage the morning of or 2 days after. Do not take Lasix, insulin, or any diabetic medicine the morning of the procedure. If you take insulin, you may only take 1/2 of any scheduled nightly dose, and none the morning of the procedure. You may take all regularly scheduled heart, cholesterol, and blood pressure medicines with a sip of water. If you take Glucophage, Metformin, Actos Plus Met, or Glucovance,you can not take this the day of your procedure and two days after the procedure. You will need to have a lab test done two days after the procedure before you restart this medicine. If you have sleep apnea and require C-PAP, please bring this with you to the hospital.  Bring a list of all of your allergies and medications with you to the hospital.  Please let our nurse know if you have had an allergy to iodine, shellfish, or x-ray dye. Let the nurse know if you take any of the following:  Over the counter herbal supplements  Diclofenec, indomethacin, ketoprofen, Caridopa/levadopa, naproxen, sulindac, piroxicam, glucosamine, Chondrotin, cocchine, or methotrexate. Plan to stay at the hospital for 4 - 6 hours before being released  by the physician. You will need someone to drive you home after the procedure. Please stop at your local [facility] or pharmacy and buy a bottle of Hibiclens.  Wash thoroughly with this the night before and the morning of the procedure, paying special attention to
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
## 2023-05-19 ENCOUNTER — TELEPHONE (OUTPATIENT)
Dept: VASCULAR SURGERY | Age: 53
End: 2023-05-19

## 2023-05-19 RX ORDER — CLONIDINE HYDROCHLORIDE 0.1 MG/1
0.1 TABLET ORAL PRN
OUTPATIENT
Start: 2023-05-19

## 2023-05-19 RX ORDER — SODIUM CHLORIDE 9 MG/ML
INJECTION, SOLUTION INTRAVENOUS PRN
OUTPATIENT
Start: 2023-05-19

## 2023-05-19 RX ORDER — SODIUM CHLORIDE 0.9 % (FLUSH) 0.9 %
5-40 SYRINGE (ML) INJECTION PRN
OUTPATIENT
Start: 2023-05-19

## 2023-05-19 RX ORDER — SODIUM CHLORIDE 9 MG/ML
INJECTION, SOLUTION INTRAVENOUS CONTINUOUS
OUTPATIENT
Start: 2023-05-19

## 2023-05-19 RX ORDER — SODIUM CHLORIDE 0.9 % (FLUSH) 0.9 %
5-40 SYRINGE (ML) INJECTION EVERY 12 HOURS SCHEDULED
OUTPATIENT
Start: 2023-05-19
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
## 2023-05-19 NOTE — TELEPHONE ENCOUNTER
Spoke with Bee Powell from [employer].  The rep will be present for the surgery on 06/02/2023 @ 7:30 AM.           ----- Message from REYNOLD Akhtar sent at 5/19/2023  7:34 AM CDT -----  Call shireen carrion for juana martinez

## 2023-05-19 NOTE — TELEPHONE ENCOUNTER
Spoke with the patient to let him know the following and that he needed to follow up with his PCP this week. I am sending the labs to [facility] as well.   The patient acknowledged and said he would give them a call.         ----- Message from REYNOLD Alexis sent at 5/19/2023  8:09 AM CDT -----  Please call patient and pcp to let them know glucose is [phone]

## 2023-06-02 ENCOUNTER — HOSPITAL ENCOUNTER (OUTPATIENT)
Age: 53
Setting detail: OBSERVATION
Discharge: LEFT BEFORE DISCHARGE | End: 2023-06-02
Attending: EMERGENCY MEDICINE | Admitting: INTERNAL MEDICINE
Payer: MEDICAID

## 2023-06-02 ENCOUNTER — TELEPHONE (OUTPATIENT)
Dept: VASCULAR SURGERY | Age: 53
End: 2023-06-02

## 2023-06-02 ENCOUNTER — HOSPITAL ENCOUNTER (OUTPATIENT)
Dept: INTERVENTIONAL RADIOLOGY/VASCULAR | Age: 53
Discharge: HOME | End: 2023-06-02
Attending: SURGERY | Admitting: SURGERY
Payer: MEDICAID

## 2023-06-02 VITALS
OXYGEN SATURATION: 98 % | DIASTOLIC BLOOD PRESSURE: 97 MMHG | SYSTOLIC BLOOD PRESSURE: 122 MMHG | BODY MASS INDEX: 34.4 KG/M2 | HEIGHT: 72 IN | RESPIRATION RATE: 14 BRPM | TEMPERATURE: 97.1 F | HEART RATE: 100 BPM | WEIGHT: 254 LBS

## 2023-06-02 VITALS
WEIGHT: 241 LBS | HEIGHT: 72 IN | TEMPERATURE: 97.9 F | OXYGEN SATURATION: 100 % | DIASTOLIC BLOOD PRESSURE: 88 MMHG | RESPIRATION RATE: 20 BRPM | SYSTOLIC BLOOD PRESSURE: 126 MMHG | BODY MASS INDEX: 32.64 KG/M2 | HEART RATE: 101 BPM

## 2023-06-02 DIAGNOSIS — I86.1 VARICOCELE: ICD-10-CM

## 2023-06-02 DIAGNOSIS — N50.811 PAIN IN RIGHT TESTICLE: ICD-10-CM

## 2023-06-02 DIAGNOSIS — R73.9 HYPERGLYCEMIA: Primary | ICD-10-CM

## 2023-06-02 DIAGNOSIS — I86.1 VARICOCELE: Primary | ICD-10-CM

## 2023-06-02 LAB
ACETONE SERPL QL SCN: NEGATIVE
ALBUMIN SERPL-MCNC: 4.1 G/DL (ref 3.5–5.2)
ALP SERPL-CCNC: 176 U/L (ref 40–130)
ALT SERPL-CCNC: 21 U/L (ref 5–41)
ANION GAP SERPL CALCULATED.3IONS-SCNC: 14 MMOL/L (ref 7–19)
AST SERPL-CCNC: 16 U/L (ref 5–40)
BASOPHILS # BLD: 0.1 K/UL (ref 0–0.2)
BASOPHILS NFR BLD: 0.7 % (ref 0–1)
BILIRUB SERPL-MCNC: 0.3 MG/DL (ref 0.2–1.2)
BILIRUB UR QL STRIP: NEGATIVE
BUN SERPL-MCNC: 18 MG/DL (ref 6–20)
CALCIUM SERPL-MCNC: 9.9 MG/DL (ref 8.6–10)
CHLORIDE SERPL-SCNC: 95 MMOL/L (ref 98–111)
CHP ED QC CHECK: NORMAL
CLARITY UR: CLEAR
CO2 SERPL-SCNC: 19 MMOL/L (ref 22–29)
COLOR UR: YELLOW
CREAT SERPL-MCNC: 0.9 MG/DL (ref 0.5–1.2)
EOSINOPHIL # BLD: 0.3 K/UL (ref 0–0.6)
EOSINOPHIL NFR BLD: 2.9 % (ref 0–5)
ERYTHROCYTE [DISTWIDTH] IN BLOOD BY AUTOMATED COUNT: 11.9 % (ref 11.5–14.5)
GLUCOSE BLD-MCNC: 436 MG/DL
GLUCOSE BLD-MCNC: 473 MG/DL (ref 70–99)
GLUCOSE BLD-MCNC: >550 MG/DL (ref 70–99)
GLUCOSE SERPL-MCNC: 715 MG/DL (ref 74–109)
GLUCOSE UR STRIP.AUTO-MCNC: =>1000 MG/DL
HBA1C MFR BLD: 10.6 % (ref 4–6)
HCT VFR BLD AUTO: 48.3 % (ref 42–52)
HGB BLD-MCNC: 17.1 G/DL (ref 14–18)
HGB UR STRIP.AUTO-MCNC: NEGATIVE MG/L
IMM GRANULOCYTES # BLD: 0 K/UL
KETONES UR STRIP.AUTO-MCNC: NEGATIVE MG/DL
LEUKOCYTE ESTERASE UR QL STRIP.AUTO: NEGATIVE
LYMPHOCYTES # BLD: 3.3 K/UL (ref 1.1–4.5)
LYMPHOCYTES NFR BLD: 37.1 % (ref 20–40)
MCH RBC QN AUTO: 32.1 PG (ref 27–31)
MCHC RBC AUTO-ENTMCNC: 35.4 G/DL (ref 33–37)
MCV RBC AUTO: 90.8 FL (ref 80–94)
MONOCYTES # BLD: 0.7 K/UL (ref 0–0.9)
MONOCYTES NFR BLD: 7.5 % (ref 0–10)
NEUTROPHILS # BLD: 4.6 K/UL (ref 1.5–7.5)
NEUTS SEG NFR BLD: 51.4 % (ref 50–65)
NITRITE UR QL STRIP.AUTO: NEGATIVE
OSMOLALITY SERPL CALC.SUM OF ELEC: 314 MOSM/KG (ref 275–300)
PERFORMED ON: ABNORMAL
PERFORMED ON: ABNORMAL
PH UR STRIP.AUTO: 5 [PH] (ref 5–8)
PLATELET # BLD AUTO: 186 K/UL (ref 130–400)
PMV BLD AUTO: 11.9 FL (ref 9.4–12.4)
POTASSIUM SERPL-SCNC: 4.5 MMOL/L (ref 3.5–5)
PROT SERPL-MCNC: 7 G/DL (ref 6.6–8.7)
PROT UR STRIP.AUTO-MCNC: NEGATIVE MG/DL
RBC # BLD AUTO: 5.32 M/UL (ref 4.7–6.1)
SODIUM SERPL-SCNC: 128 MMOL/L (ref 136–145)
SP GR UR STRIP.AUTO: 1.04 (ref 1–1.03)
UROBILINOGEN UR STRIP.AUTO-MCNC: 0.2 E.U./DL
WBC # BLD AUTO: 8.9 K/UL (ref 4.8–10.8)

## 2023-06-02 PROCEDURE — 81003 URINALYSIS AUTO W/O SCOPE: CPT

## 2023-06-02 PROCEDURE — 2580000003 HC RX 258: Performed by: EMERGENCY MEDICINE

## 2023-06-02 PROCEDURE — 82009 KETONE BODYS QUAL: CPT

## 2023-06-02 PROCEDURE — 36415 COLL VENOUS BLD VENIPUNCTURE: CPT

## 2023-06-02 PROCEDURE — 96372 THER/PROPH/DIAG INJ SC/IM: CPT

## 2023-06-02 PROCEDURE — 2580000003 HC RX 258: Performed by: NURSE PRACTITIONER

## 2023-06-02 PROCEDURE — 83036 HEMOGLOBIN GLYCOSYLATED A1C: CPT

## 2023-06-02 PROCEDURE — 96360 HYDRATION IV INFUSION INIT: CPT

## 2023-06-02 PROCEDURE — 82962 GLUCOSE BLOOD TEST: CPT

## 2023-06-02 PROCEDURE — 80053 COMPREHEN METABOLIC PANEL: CPT

## 2023-06-02 PROCEDURE — 99284 EMERGENCY DEPT VISIT MOD MDM: CPT

## 2023-06-02 PROCEDURE — 83930 ASSAY OF BLOOD OSMOLALITY: CPT

## 2023-06-02 PROCEDURE — 96361 HYDRATE IV INFUSION ADD-ON: CPT

## 2023-06-02 PROCEDURE — 6370000000 HC RX 637 (ALT 250 FOR IP): Performed by: EMERGENCY MEDICINE

## 2023-06-02 PROCEDURE — 85025 COMPLETE CBC W/AUTO DIFF WBC: CPT

## 2023-06-02 RX ORDER — INSULIN LISPRO 100 [IU]/ML
0-4 INJECTION, SOLUTION INTRAVENOUS; SUBCUTANEOUS NIGHTLY
Status: CANCELLED | OUTPATIENT
Start: 2023-06-02

## 2023-06-02 RX ORDER — SODIUM CHLORIDE 0.9 % (FLUSH) 0.9 %
5-40 SYRINGE (ML) INJECTION EVERY 12 HOURS SCHEDULED
Status: CANCELLED | OUTPATIENT
Start: 2023-06-02

## 2023-06-02 RX ORDER — SODIUM CHLORIDE 0.9 % (FLUSH) 0.9 %
5-40 SYRINGE (ML) INJECTION PRN
Status: CANCELLED | OUTPATIENT
Start: 2023-06-02

## 2023-06-02 RX ORDER — CLONIDINE HYDROCHLORIDE 0.1 MG/1
0.1 TABLET ORAL PRN
Status: DISCONTINUED | OUTPATIENT
Start: 2023-06-02 | End: 2023-06-04 | Stop reason: HOSPADM

## 2023-06-02 RX ORDER — ONDANSETRON 4 MG/1
4 TABLET, ORALLY DISINTEGRATING ORAL EVERY 8 HOURS PRN
Status: CANCELLED | OUTPATIENT
Start: 2023-06-02

## 2023-06-02 RX ORDER — ACETAMINOPHEN 650 MG/1
650 SUPPOSITORY RECTAL EVERY 6 HOURS PRN
Status: CANCELLED | OUTPATIENT
Start: 2023-06-02

## 2023-06-02 RX ORDER — ENOXAPARIN SODIUM 100 MG/ML
30 INJECTION SUBCUTANEOUS 2 TIMES DAILY
Status: CANCELLED | OUTPATIENT
Start: 2023-06-02

## 2023-06-02 RX ORDER — 0.9 % SODIUM CHLORIDE 0.9 %
500 INTRAVENOUS SOLUTION INTRAVENOUS ONCE
Status: COMPLETED | OUTPATIENT
Start: 2023-06-02 | End: 2023-06-02

## 2023-06-02 RX ORDER — SODIUM CHLORIDE 9 MG/ML
INJECTION, SOLUTION INTRAVENOUS CONTINUOUS
Status: DISCONTINUED | OUTPATIENT
Start: 2023-06-02 | End: 2023-06-04 | Stop reason: HOSPADM

## 2023-06-02 RX ORDER — SODIUM CHLORIDE 9 MG/ML
INJECTION, SOLUTION INTRAVENOUS PRN
Status: DISCONTINUED | OUTPATIENT
Start: 2023-06-02 | End: 2023-06-04 | Stop reason: HOSPADM

## 2023-06-02 RX ORDER — SODIUM CHLORIDE 0.9 % (FLUSH) 0.9 %
5-40 SYRINGE (ML) INJECTION EVERY 12 HOURS SCHEDULED
Status: DISCONTINUED | OUTPATIENT
Start: 2023-06-02 | End: 2023-06-04 | Stop reason: HOSPADM

## 2023-06-02 RX ORDER — TRAMADOL HYDROCHLORIDE 50 MG/1
TABLET ORAL
COMMUNITY

## 2023-06-02 RX ORDER — INSULIN LISPRO 100 [IU]/ML
10 INJECTION, SOLUTION INTRAVENOUS; SUBCUTANEOUS ONCE
Status: COMPLETED | OUTPATIENT
Start: 2023-06-02 | End: 2023-06-02

## 2023-06-02 RX ORDER — ACETAMINOPHEN 325 MG/1
650 TABLET ORAL EVERY 6 HOURS PRN
Status: CANCELLED | OUTPATIENT
Start: 2023-06-02

## 2023-06-02 RX ORDER — SODIUM CHLORIDE 0.9 % (FLUSH) 0.9 %
5-40 SYRINGE (ML) INJECTION PRN
Status: DISCONTINUED | OUTPATIENT
Start: 2023-06-02 | End: 2023-06-04 | Stop reason: HOSPADM

## 2023-06-02 RX ORDER — DEXTROSE MONOHYDRATE 100 MG/ML
INJECTION, SOLUTION INTRAVENOUS CONTINUOUS PRN
Status: CANCELLED | OUTPATIENT
Start: 2023-06-02

## 2023-06-02 RX ORDER — ONDANSETRON 2 MG/ML
4 INJECTION INTRAMUSCULAR; INTRAVENOUS EVERY 6 HOURS PRN
Status: CANCELLED | OUTPATIENT
Start: 2023-06-02

## 2023-06-02 RX ORDER — INSULIN GLARGINE 100 [IU]/ML
10 INJECTION, SOLUTION SUBCUTANEOUS NIGHTLY
Status: CANCELLED | OUTPATIENT
Start: 2023-06-02

## 2023-06-02 RX ORDER — INSULIN LISPRO 100 [IU]/ML
0-8 INJECTION, SOLUTION INTRAVENOUS; SUBCUTANEOUS
Status: CANCELLED | OUTPATIENT
Start: 2023-06-02

## 2023-06-02 RX ORDER — POLYETHYLENE GLYCOL 3350 17 G/17G
17 POWDER, FOR SOLUTION ORAL DAILY PRN
Status: CANCELLED | OUTPATIENT
Start: 2023-06-02

## 2023-06-02 RX ORDER — SODIUM CHLORIDE 9 MG/ML
INJECTION, SOLUTION INTRAVENOUS PRN
Status: CANCELLED | OUTPATIENT
Start: 2023-06-02

## 2023-06-02 RX ADMIN — SODIUM CHLORIDE 500 ML: 9 INJECTION, SOLUTION INTRAVENOUS at 10:03

## 2023-06-02 RX ADMIN — ALUMINUM HYDROXIDE, MAGNESIUM HYDROXIDE, AND SIMETHICONE: 200; 200; 20 SUSPENSION ORAL at 10:04

## 2023-06-02 RX ADMIN — INSULIN LISPRO 10 UNITS: 100 INJECTION, SOLUTION INTRAVENOUS; SUBCUTANEOUS at 10:07

## 2023-06-02 RX ADMIN — SODIUM CHLORIDE: 9 INJECTION, SOLUTION INTRAVENOUS at 11:44

## 2023-06-02 RX ADMIN — SODIUM CHLORIDE: 9 INJECTION, SOLUTION INTRAVENOUS at 06:51

## 2023-06-02 ASSESSMENT — PAIN - FUNCTIONAL ASSESSMENT: PAIN_FUNCTIONAL_ASSESSMENT: 0-10

## 2023-06-02 ASSESSMENT — ENCOUNTER SYMPTOMS
DIARRHEA: 0
SHORTNESS OF BREATH: 0
ABDOMINAL PAIN: 0
VOMITING: 0
EYE PAIN: 0

## 2023-06-02 ASSESSMENT — PAIN SCALES - GENERAL: PAINLEVEL_OUTOF10: 7

## 2023-06-02 ASSESSMENT — PAIN DESCRIPTION - LOCATION: LOCATION: GROIN

## 2023-06-02 NOTE — TELEPHONE ENCOUNTER
Spoke with the patient to let him know that I made him an appointment with his PCP Dr. Jolynn Watson on Monday, 06/05/2023. The patient stated that he just saw his PCP yesterday and they were aware of his high blood sugar and put him on a different medicine. I have cancelled the appointment with Dr. Jolynn Watson for Monday as they are aware of the findings. The patient is aware of this as well. The patient is also scheduled for a CTA on June 9, 2023 @ 9:00 AM.  The patient is aware to be NPO 4-6 hours prior. The patient is aware and acknowledged. ----- Message from REYNOLD Queen sent at 6/2/2023  8:32 AM CDT -----  Please send glucose to his pcp and make him an appt. She wants us to schedule ct abd pelvis VENOUS phase to look at varicocele. We will not reschedule him until ct is done and reviewed and sugar under control.

## 2023-06-02 NOTE — ED NOTES
Pt provided urinal for specimen, states he is unable to void at this time but will notify this RN when able.       Garfield Canas RN  06/02/23 [id number]

## 2023-06-02 NOTE — PROGRESS NOTES
DR. Ruddy Azar HERE. PT SEEN. DAUGHTER PRESENT. PROCEDURE CANCELED TODAY DUE TO ELEVATED GLUCOSE. PT TO BE SEEN PER  IN ER. TODAY'S PROCEDURE TO BE RESCHEDULED. VASCULAR OFFICE TO CALL PT & RESCHEDULE. EXPLAINED IMPORTANCE OF DIET AND TAKING MEDICATIONS AS PRESCRIBED. PT & DAUGHTER VOICED UNDERSTANDINGS.

## 2023-06-02 NOTE — ED PROVIDER NOTES
[facility]  eMERGENCY dEPARTMENT eNCOUnter      Pt Name: Carlos Stokes  MRN: [id number]  [place] 1970  Date of evaluation: 6/2/2023  Provider: Lake Clemens MD    CHIEF COMPLAINT       Chief Complaint   Patient presents with    Hyperglycemia     Pt was here for cath procedure and labs showed blood sugar in the 700s          HISTORY OF PRESENT ILLNESS   (Location/Symptom, Timing/Onset,Context/Setting, Quality, Duration, Modifying Factors, Severity)  Note limiting factors. Carlos Stokes is a 46 y.o. male who presents to the emergency department for hyperglycemia. Patient was scheduled to have surgery today. Chu Jarvis he was having outpatient procedure by vascular surgery due to chronic varicocele causing scrotal pain. When I checked his labs this morning his blood sugar was found to be in the 700s and he was sent here. Patient is diabetic. Admits he is only somewhat compliant with his medication. Not on insulin. Has not had anything this morning because of the scheduled surgery. No complaints. Complains of scrotal pain but said this is chronic since an injury last year and very clear this is not new or different from normal.  Only complaint he has is indigestion but states this is because he did not have his medication this morning and typically this causes indigestion. HPI    NursingNotes were reviewed. REVIEW OF SYSTEMS    (2-9 systems for level 4, 10 or more for level 5)     Review of Systems   Constitutional:  Negative for fever. Eyes:  Negative for pain. Respiratory:  Negative for shortness of breath. Cardiovascular:  Negative for chest pain and palpitations. Gastrointestinal:  Negative for abdominal pain, diarrhea and vomiting. Genitourinary:  Negative for dysuria. Skin:  Negative for rash. Neurological:  Negative for weakness and headaches. All other systems reviewed and are negative.     A complete review of systems was performed and is negative except as noted

## 2023-06-02 NOTE — ED TRIAGE NOTES
Pt requesting to go outside to smoke, pt informed this is a no smoking facility.  Pt declines nicotine patch

## 2023-06-02 NOTE — PROGRESS NOTES
INFORMED DR. DAWN OF ACCUCHEK RESULT OF \"TOO HIGH\" LAB GLUCOSE COLLECTED WITH RESULT . DR. Portia Cao TO COME SEE PT.

## 2023-06-02 NOTE — PROGRESS NOTES
Report called to ER. Informed them of current glucose of 751. Ambulated with pt to er room 6 with NS VIA ALMA Jay now. Iv site clear Pt insisting on going outside to smoke. Explained to pt of non-smoking facility. Pt continues to say he is going to smoke. Pt now in ER 11. Informed KAHLIL Isaac of pt's continuation of stating he was going to smoke. Pt's daughter present in room with pt.

## 2023-06-14 ENCOUNTER — TRANSCRIBE ORDERS (OUTPATIENT)
Dept: ADMINISTRATIVE | Facility: HOSPITAL | Age: 53
End: 2023-06-14
Payer: COMMERCIAL

## 2023-06-14 DIAGNOSIS — G89.29 CHRONIC ELBOW PAIN, RIGHT: Primary | ICD-10-CM

## 2023-06-14 DIAGNOSIS — M25.521 CHRONIC ELBOW PAIN, RIGHT: Primary | ICD-10-CM

## 2023-06-20 ENCOUNTER — HOSPITAL ENCOUNTER (OUTPATIENT)
Dept: CT IMAGING | Age: 53
Discharge: HOME | End: 2023-06-20
Payer: MEDICAID

## 2023-06-20 DIAGNOSIS — I86.1 VARICOCELE: ICD-10-CM

## 2023-06-20 DIAGNOSIS — N50.811 PAIN IN RIGHT TESTICLE: ICD-10-CM

## 2023-06-20 LAB
CREAT SERPL-MCNC: 0.9 MG/DL (ref 0.3–1.3)
PERFORMED ON: NORMAL
POC SAMPLE TYPE: NORMAL

## 2023-06-20 PROCEDURE — 74174 CTA ABD&PLVS W/CONTRAST: CPT

## 2023-06-20 PROCEDURE — 82565 ASSAY OF CREATININE: CPT

## 2023-06-20 PROCEDURE — 6360000004 HC RX CONTRAST MEDICATION: Performed by: NURSE PRACTITIONER

## 2023-06-20 RX ADMIN — IOPAMIDOL 70 ML: 755 INJECTION, SOLUTION INTRAVENOUS at 09:05

## 2023-06-21 ENCOUNTER — TELEPHONE (OUTPATIENT)
Dept: VASCULAR SURGERY | Age: 53
End: 2023-06-21

## 2023-06-21 NOTE — TELEPHONE ENCOUNTER
Kinjal Ng with [facility]  [facility] called asking office to send an acutal order stating the CPT codes that pertain to pt. Please fax to [phone] ATTN: Kinjal Ng.     Thank you

## 2023-06-22 ENCOUNTER — TELEPHONE (OUTPATIENT)
Dept: VASCULAR SURGERY | Age: 53
End: 2023-06-22

## 2023-06-22 DIAGNOSIS — Z01.818 PRE-OP TESTING: Primary | ICD-10-CM

## 2023-06-22 NOTE — TELEPHONE ENCOUNTER
Spoke with the patient to let him know the following. The patient acknowledged. Jose Manuel Bench    Surgery Directions    Report to the [facility] at [facility] (go in the         front door to the left) on Friday, 07/07/2023 @ 8:30 AM.   Nothing to eat or drink after midnight the night before the procedure. Please take all medications as normally scheduled to take, including heart and blood pressure medicines with a sip of water. Except Glucophage and [place]. Do not take Lasix, insulin, or any diabetic medicine the morning of the procedure. If you take insulin, you may only take 1/2 of any scheduled nightly dose, and none the morning of the procedure. You may take all regularly scheduled heart, cholesterol, and blood pressure medicines with a sip of water. If you take Glucophage, Metformin, Actos Plus Met, or Glucovance,you can not take this the day of your procedure and two days after the procedure. You will need to have a lab test done two days after the procedure before you restart this medicine. If you have sleep apnea and require C-PAP, please bring this with you to the hospital.  Bring a list of all of your allergies and medications with you to the hospital.  Please let our nurse know if you have had an allergy to iodine, shellfish, or x-ray dye. Let the nurse know if you take any of the following:  Over the counter herbal supplements  Diclofenec, indomethacin, ketoprofen, Caridopa/levadopa, naproxen, sulindac, piroxicam, glucosamine, Chondrotin, cocchine, or methotrexate. Plan to stay at the hospital for 4 - 6 hours before being released  by the physician. You will need someone to drive you home after the procedure. Please stop at your local [facility] or pharmacy and buy a bottle of Hibiclens. Wash thoroughly with this the night before and the morning of the procedure, paying special attention to the area that will be operated on. Make sure you rinse very well.  The

## 2023-06-23 NOTE — TELEPHONE ENCOUNTER
Ceasar Krabbe with workers comp called this week to try to obtain the CPT codes for this patient's procedure. He is not working with Workers comp anymore and is going through his personal insurance. I let him know that she called and he asked that I not send her any information. I let him know that I wouldn't but would document that she called and we spoke prior to giving her anything from this date forward. The patient acknowledged.

## 2023-07-07 ENCOUNTER — HOSPITAL ENCOUNTER (OUTPATIENT)
Dept: INTERVENTIONAL RADIOLOGY/VASCULAR | Age: 53
Discharge: HOME | End: 2023-07-07
Attending: SURGERY | Admitting: SURGERY
Payer: MEDICAID

## 2023-07-07 VITALS
SYSTOLIC BLOOD PRESSURE: 127 MMHG | BODY MASS INDEX: 31.83 KG/M2 | DIASTOLIC BLOOD PRESSURE: 86 MMHG | HEART RATE: 92 BPM | WEIGHT: 235 LBS | HEIGHT: 72 IN | OXYGEN SATURATION: 99 % | RESPIRATION RATE: 12 BRPM | TEMPERATURE: 96.4 F

## 2023-07-07 PROBLEM — I86.1 VARICOCELE: Status: ACTIVE | Noted: 2023-07-07

## 2023-07-07 LAB
ANION GAP SERPL CALCULATED.3IONS-SCNC: 9 MMOL/L (ref 7–19)
BASOPHILS # BLD: 0.1 K/UL (ref 0–0.2)
BASOPHILS NFR BLD: 0.7 % (ref 0–1)
BUN SERPL-MCNC: 23 MG/DL (ref 6–20)
CALCIUM SERPL-MCNC: 9.2 MG/DL (ref 8.6–10)
CHLORIDE SERPL-SCNC: 102 MMOL/L (ref 98–111)
CO2 SERPL-SCNC: 28 MMOL/L (ref 22–29)
CREAT SERPL-MCNC: 0.8 MG/DL (ref 0.5–1.2)
EOSINOPHIL # BLD: 0.3 K/UL (ref 0–0.6)
EOSINOPHIL NFR BLD: 3.1 % (ref 0–5)
ERYTHROCYTE [DISTWIDTH] IN BLOOD BY AUTOMATED COUNT: 13 % (ref 11.5–14.5)
GLUCOSE SERPL-MCNC: 170 MG/DL (ref 74–109)
HCT VFR BLD AUTO: 46.3 % (ref 42–52)
HGB BLD-MCNC: 16 G/DL (ref 14–18)
IMM GRANULOCYTES # BLD: 0 K/UL
LYMPHOCYTES # BLD: 4 K/UL (ref 1.1–4.5)
LYMPHOCYTES NFR BLD: 41.5 % (ref 20–40)
MCH RBC QN AUTO: 31.9 PG (ref 27–31)
MCHC RBC AUTO-ENTMCNC: 34.6 G/DL (ref 33–37)
MCV RBC AUTO: 92.4 FL (ref 80–94)
MONOCYTES # BLD: 0.9 K/UL (ref 0–0.9)
MONOCYTES NFR BLD: 8.9 % (ref 0–10)
NEUTROPHILS # BLD: 4.4 K/UL (ref 1.5–7.5)
NEUTS SEG NFR BLD: 45.5 % (ref 50–65)
PLATELET # BLD AUTO: 190 K/UL (ref 130–400)
PMV BLD AUTO: 10.4 FL (ref 9.4–12.4)
POTASSIUM SERPL-SCNC: 3.9 MMOL/L (ref 3.5–5)
RBC # BLD AUTO: 5.01 M/UL (ref 4.7–6.1)
SODIUM SERPL-SCNC: 139 MMOL/L (ref 136–145)
WBC # BLD AUTO: 9.7 K/UL (ref 4.8–10.8)

## 2023-07-07 PROCEDURE — 80048 BASIC METABOLIC PNL TOTAL CA: CPT

## 2023-07-07 PROCEDURE — 2500000003 HC RX 250 WO HCPCS: Performed by: SURGERY

## 2023-07-07 PROCEDURE — 36415 COLL VENOUS BLD VENIPUNCTURE: CPT

## 2023-07-07 PROCEDURE — 2709999900 IR VENOGRAM LOWER EXTREMITY RIGHT

## 2023-07-07 PROCEDURE — 99152 MOD SED SAME PHYS/QHP 5/>YRS: CPT

## 2023-07-07 PROCEDURE — 6360000002 HC RX W HCPCS: Performed by: NURSE PRACTITIONER

## 2023-07-07 PROCEDURE — 6360000004 HC RX CONTRAST MEDICATION: Performed by: SURGERY

## 2023-07-07 PROCEDURE — 94640 AIRWAY INHALATION TREATMENT: CPT

## 2023-07-07 PROCEDURE — 37241 VASC EMBOLIZE/OCCLUDE VENOUS: CPT

## 2023-07-07 PROCEDURE — 36012 PLACE CATHETER IN VEIN: CPT

## 2023-07-07 PROCEDURE — 6360000002 HC RX W HCPCS: Performed by: SURGERY

## 2023-07-07 PROCEDURE — 99153 MOD SED SAME PHYS/QHP EA: CPT

## 2023-07-07 PROCEDURE — 6370000000 HC RX 637 (ALT 250 FOR IP): Performed by: SURGERY

## 2023-07-07 PROCEDURE — 85025 COMPLETE CBC W/AUTO DIFF WBC: CPT

## 2023-07-07 PROCEDURE — 2580000003 HC RX 258: Performed by: NURSE PRACTITIONER

## 2023-07-07 PROCEDURE — 6370000000 HC RX 637 (ALT 250 FOR IP): Performed by: NURSE PRACTITIONER

## 2023-07-07 RX ORDER — FENTANYL CITRATE 50 UG/ML
INJECTION, SOLUTION INTRAMUSCULAR; INTRAVENOUS PRN
Status: COMPLETED | OUTPATIENT
Start: 2023-07-07 | End: 2023-07-07

## 2023-07-07 RX ORDER — IODIXANOL 320 MG/ML
INJECTION, SOLUTION INTRAVASCULAR PRN
Status: COMPLETED | OUTPATIENT
Start: 2023-07-07 | End: 2023-07-07

## 2023-07-07 RX ORDER — SODIUM CHLORIDE 9 MG/ML
INJECTION, SOLUTION INTRAVENOUS PRN
Status: DISCONTINUED | OUTPATIENT
Start: 2023-07-07 | End: 2023-07-09 | Stop reason: HOSPADM

## 2023-07-07 RX ORDER — ASPIRIN 81 MG/1
81 TABLET ORAL ONCE
Status: COMPLETED | OUTPATIENT
Start: 2023-07-07 | End: 2023-07-07

## 2023-07-07 RX ORDER — LIDOCAINE HYDROCHLORIDE 20 MG/ML
INJECTION, SOLUTION EPIDURAL; INFILTRATION; INTRACAUDAL; PERINEURAL PRN
Status: COMPLETED | OUTPATIENT
Start: 2023-07-07 | End: 2023-07-07

## 2023-07-07 RX ORDER — IPRATROPIUM BROMIDE AND ALBUTEROL SULFATE 2.5; .5 MG/3ML; MG/3ML
1 SOLUTION RESPIRATORY (INHALATION) ONCE
Status: COMPLETED | OUTPATIENT
Start: 2023-07-07 | End: 2023-07-07

## 2023-07-07 RX ORDER — SODIUM CHLORIDE 0.9 % (FLUSH) 0.9 %
5-40 SYRINGE (ML) INJECTION PRN
Status: DISCONTINUED | OUTPATIENT
Start: 2023-07-07 | End: 2023-07-09 | Stop reason: HOSPADM

## 2023-07-07 RX ORDER — SODIUM CHLORIDE 9 MG/ML
INJECTION, SOLUTION INTRAVENOUS CONTINUOUS
Status: DISCONTINUED | OUTPATIENT
Start: 2023-07-07 | End: 2023-07-09 | Stop reason: HOSPADM

## 2023-07-07 RX ORDER — CLONIDINE HYDROCHLORIDE 0.1 MG/1
0.1 TABLET ORAL PRN
Status: DISCONTINUED | OUTPATIENT
Start: 2023-07-07 | End: 2023-07-09 | Stop reason: HOSPADM

## 2023-07-07 RX ORDER — MIDAZOLAM HYDROCHLORIDE 1 MG/ML
INJECTION INTRAMUSCULAR; INTRAVENOUS PRN
Status: COMPLETED | OUTPATIENT
Start: 2023-07-07 | End: 2023-07-07

## 2023-07-07 RX ORDER — SODIUM CHLORIDE 0.9 % (FLUSH) 0.9 %
5-40 SYRINGE (ML) INJECTION EVERY 12 HOURS SCHEDULED
Status: DISCONTINUED | OUTPATIENT
Start: 2023-07-07 | End: 2023-07-09 | Stop reason: HOSPADM

## 2023-07-07 RX ORDER — HEPARIN SODIUM 5000 [USP'U]/ML
INJECTION, SOLUTION INTRAVENOUS; SUBCUTANEOUS PRN
Status: COMPLETED | OUTPATIENT
Start: 2023-07-07 | End: 2023-07-07

## 2023-07-07 RX ADMIN — IPRATROPIUM BROMIDE AND ALBUTEROL SULFATE 1 DOSE: 2.5; .5 SOLUTION RESPIRATORY (INHALATION) at 12:08

## 2023-07-07 RX ADMIN — ASPIRIN 81 MG: 81 TABLET, COATED ORAL at 09:01

## 2023-07-07 RX ADMIN — FENTANYL CITRATE 50 MCG: 50 INJECTION, SOLUTION INTRAMUSCULAR; INTRAVENOUS at 13:05

## 2023-07-07 RX ADMIN — FENTANYL CITRATE 50 MCG: 50 INJECTION, SOLUTION INTRAMUSCULAR; INTRAVENOUS at 13:32

## 2023-07-07 RX ADMIN — WATER 2000 MG: 1 INJECTION INTRAMUSCULAR; INTRAVENOUS; SUBCUTANEOUS at 13:53

## 2023-07-07 RX ADMIN — MIDAZOLAM 1 MG: 1 INJECTION INTRAMUSCULAR; INTRAVENOUS at 13:05

## 2023-07-07 RX ADMIN — MIDAZOLAM 1 MG: 1 INJECTION INTRAMUSCULAR; INTRAVENOUS at 13:18

## 2023-07-07 RX ADMIN — MIDAZOLAM 1 MG: 1 INJECTION INTRAMUSCULAR; INTRAVENOUS at 13:03

## 2023-07-07 RX ADMIN — FENTANYL CITRATE 50 MCG: 50 INJECTION, SOLUTION INTRAMUSCULAR; INTRAVENOUS at 13:02

## 2023-07-07 RX ADMIN — LIDOCAINE HYDROCHLORIDE 10 ML: 20 INJECTION, SOLUTION EPIDURAL; INFILTRATION; INTRACAUDAL; PERINEURAL at 13:02

## 2023-07-07 RX ADMIN — HEPARIN SODIUM 5000 UNITS: 5000 INJECTION INTRAVENOUS; SUBCUTANEOUS at 13:00

## 2023-07-07 RX ADMIN — SODIUM CHLORIDE: 9 INJECTION, SOLUTION INTRAVENOUS at 08:57

## 2023-07-07 RX ADMIN — MIDAZOLAM 1 MG: 1 INJECTION INTRAMUSCULAR; INTRAVENOUS at 13:32

## 2023-07-07 RX ADMIN — FENTANYL CITRATE 50 MCG: 50 INJECTION, SOLUTION INTRAMUSCULAR; INTRAVENOUS at 13:18

## 2023-07-07 RX ADMIN — IODIXANOL 65 ML: 320 INJECTION, SOLUTION INTRAVASCULAR at 13:57
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
## 2023-07-07 NOTE — PROGRESS NOTES
Patient returned to room in CVI holding. Iv restarted in left forearm with 20 gauge cathlon and IV fluids resumed.

## 2023-07-07 NOTE — PROGRESS NOTES
Patient very anxious about having to wait to have procedure done. States \" I smoke  and I don't know if I can wait. \"  Special procedures nurses notified and will hav e patient moved up to 3rd case. Patient notified.

## 2023-07-07 NOTE — DISCHARGE INSTRUCTIONS
No heavy lifting or sexual activity for 2 weeks      1. Keep dressing in place to groin site for 24 hours. 2 Okay to shower after 24 hours. 3.Apply bandaid to site daily for 3 days then remove and leave off. 4.Monitor site for any sign of infection. Notify MD if this occurs. 5.Rest until morning up to bathroom only. 6.Do not drive for 24 hours. 7.Drink one 8-10 oz glass of non-alcoholic liquid every one hour until bedtime. 8.Check the site after each activity for bleeding or swelling. 9. If bleeding occurs, apply pressure to site. 10.Keep affected leg straight until bedtime doing leg exercises to encourage blood flow, (Try wiggling your toes and rotating your ankle in circles)  11. Resume normal ( non-strenuous) activity tomorrow. 12.Bruising and soreness at the puncture site may occur. This will heal and clear after several weeks.

## 2023-07-07 NOTE — INTERVAL H&P NOTE
Update History & Physical    The patient's History and Physical of July 7, 2023 was reviewed with the patient and I examined the patient. There was no change. The surgical site was confirmed by the patient and me. Plan: The risks, benefits, expected outcome, and alternative to the recommended procedure have been discussed with the patient. Patient understands and wants to proceed with the procedure.      ASA III, Mallampati 3    Electronically signed by Josefa Abraham DO on 7/7/2023 at 1:01 PM

## 2023-07-07 NOTE — PROGRESS NOTES
Patient again stated that he \" just couldn't wait any longer\" Informed him that he will be going over as next case and that he would be leaving against medical advise. Patient very angry and said that he was leaving.

## 2023-07-07 NOTE — OP NOTE
Patient Name: Simona Del Angel   YOB: 1970   MRN: [id number]     Procedure Date: 7/7/2023     Pre Op Diagnosis: scrotal varicocele. Post Op Diagnosis: same    Procedure: Left gonadal vein coil embolization using Pod 3  20cm coil followed by two packing 60cm coils and standard  3 x 20 coil at the end    Anesthesia: Local with Moderate Sedation      Estimated Blood Loss: Less than 50 ml    Complications: None    Implants: od 3  20cm coil followed by two packing 60cm coils and standard  3 x 20 coil at the end    Procedure Details: Brought to the angiogram suite and placed in supine position. His bilateral groins were prepped draped fashion. Timeout performed. Right common femoral vein was accessed under continuous ultrasound guidance using standard micropuncture technique. 5 [place] glide sheath was inserted. Floppy Glidewire was inserted proximal, the sheath was exchanged to the 6 [place] Ansell sheath. Venogram was performed showing the left renal vein. Sheath was positioned with the tip at the left renal vein. Glidewire was advanced into the left gonadal vein. Wire cath was advanced further over the wire, then the sheath was advanced further also. Imaging was performed showing varicocele of gonadal vein. We then advanced Lantern microcatheter and performed coil embolization using Radha coils starting with the POD 3 20 cm, followed by two packing coils 60cm each. It was then finished using 3 x 20 coil at the end. At the completion there is no flow in the varicocele successful coil embolization of left gonadal vein. This sheath was removed and manual pressure held and hemostasis. Patient tolerated procedure well and discharged back in stable condition.     Findings: Varicocele, off left gonadal vein    Disposition:aroused from sedation, and taken to the recovery room in a stable condition    DO Malika  7/7/2023 1:55 PM

## 2023-07-07 NOTE — PROGRESS NOTES
Returned post venogram.  Awake and alert. Puncture site to right groin site clear with gauze and tegaderm dressing in place. Denies any c/o pain.

## 2023-07-07 NOTE — PROGRESS NOTES
Patient again stated he was not staying any longer and insisted to have IV removed and everything taken off. Right groin site clear.

## 2023-07-25 ENCOUNTER — OFFICE VISIT (OUTPATIENT)
Dept: VASCULAR SURGERY | Age: 53
End: 2023-07-25
Payer: MEDICAID

## 2023-07-25 VITALS
HEIGHT: 72 IN | WEIGHT: 235 LBS | HEART RATE: 96 BPM | TEMPERATURE: 96.6 F | OXYGEN SATURATION: 97 % | BODY MASS INDEX: 31.83 KG/M2 | SYSTOLIC BLOOD PRESSURE: 113 MMHG | DIASTOLIC BLOOD PRESSURE: 75 MMHG

## 2023-07-25 DIAGNOSIS — I86.1 VARICOCELE: Primary | ICD-10-CM

## 2023-07-25 PROCEDURE — 3078F DIAST BP <80 MM HG: CPT | Performed by: NURSE PRACTITIONER

## 2023-07-25 PROCEDURE — 3074F SYST BP LT 130 MM HG: CPT | Performed by: NURSE PRACTITIONER

## 2023-07-25 PROCEDURE — 99212 OFFICE O/P EST SF 10 MIN: CPT | Performed by: NURSE PRACTITIONER

## 2023-07-25 RX ORDER — DULAGLUTIDE 1.5 MG/.5ML
INJECTION, SOLUTION SUBCUTANEOUS
COMMUNITY
Start: 2023-07-12

## 2023-07-25 RX ORDER — ATORVASTATIN CALCIUM 80 MG/1
TABLET, FILM COATED ORAL
COMMUNITY
Start: 2023-06-02

## 2023-07-26 NOTE — PROGRESS NOTES
Ria Ortiz (:  1970) is a 46 y.o. male,Established patient, here for evaluation of the following chief complaint(s):  Follow-up (2 week follow up venogram. )            SUBJECTIVE/OBJECTIVE:  Patient presents for follow up after a venogram with coil embolization done 2 weeks ago. Procedure was done for testicular pain. Post op problems include none. He reports he continues to have pain in right testicle radiating up to pelvic region. He had not seen any difference yet.         I have personally reviewed the following: problem list, current meds, allergies, PMH, PSH, family hx, and social hx  Ria Ortiz is a 46 y.o. male with the following history as recorded in [facility]:  Patient Active Problem List    Diagnosis Date Noted    Arthritis 2022    Lung nodule 2022    Mixed hyperlipidemia due to type 2 diabetes mellitus ([address]) 2022    Nicotine dependence 2022    Abnormal finding on evaluation procedure 2020    Obesity with body mass index 30 or greater 2020    Chronic diarrhea 2020    Obstructive sleep apnea syndrome 2019    Ingrowing nail 2018    Acute sinusitis 2018    Acute pain of right knee 2018    Disorder due to well controlled type 2 diabetes mellitus ([address]) 2018    Osteoarthritis of patellofemoral joint 2018    Onychomycosis 2018    Severe obesity ([address]) 2018    Snoring 2018    Foreign body 2018    Pain in joint of right shoulder 2018    Lateral epicondylitis of right elbow 2017    Fear of hypertension 2017    Hypertensive disorder 2017    Mixed hyperlipidemia 2017    Secondary polycythemia 2017    Chronic right shoulder pain 2017    Meralgia paresthetica 2017    Gastroesophageal reflux disease 2015    Clinical finding present on admission 2015    Overweight 2015    Type 2 diabetes mellitus without complication ([address])

## 2023-10-05 ENCOUNTER — TELEPHONE (OUTPATIENT)
Dept: GASTROENTEROLOGY | Age: 53
End: 2023-10-05

## 2024-05-28 ENCOUNTER — TELEPHONE (OUTPATIENT)
Dept: NEUROSURGERY | Age: 54
End: 2024-05-28

## 2024-05-28 NOTE — TELEPHONE ENCOUNTER
1st attempt to contact patient. I left a voicemail instructing patient to call back at [phone] to schedule their new patient appointment     Other chronic pain

## 2025-01-13 NOTE — TELEPHONE ENCOUNTER
Rony Cook called to schedule a  egd and cln . Please be advised that the best time to call him to accommodate their needs is Anytime. Thank you. Spoke to Yarelis WILSON at [facility], she states she will have to call office back after reviewing patient's medical chart. Spoke to Jackelin and cancelled prescription    Pasha- please sign pended order if agreeable